# Patient Record
Sex: FEMALE | Race: WHITE | NOT HISPANIC OR LATINO | Employment: FULL TIME | URBAN - METROPOLITAN AREA
[De-identification: names, ages, dates, MRNs, and addresses within clinical notes are randomized per-mention and may not be internally consistent; named-entity substitution may affect disease eponyms.]

---

## 2017-01-02 ENCOUNTER — TELEPHONE (OUTPATIENT)
Dept: NURSING | Facility: CLINIC | Age: 22
End: 2017-01-02

## 2017-01-02 NOTE — TELEPHONE ENCOUNTER
Call Type: Triage Call    Presenting Problem: Patient calls and says she was seen at the end of  December and just now got her voicemails including one from the  clinic.  Advised her of results of her Pap test.  Read note on chart  to her.  Progress Notes   ?   Elizabeth Rashid, RN at 12/28/2016  5:22 AM   ? Status: Sign at close encounter      ? Expand All  Collapse All      12/22/2016:Pap--LSIL. <24 yrs of age. Plan to  repeat Cytology only in 1 year. Reminder placed in TRACKING  Results/Follow-up plan sent to patient by mail.  Elizabeth Rashid,  Pap Tracking RN        Advised her to call clinic tomorrow with any  further questions or concerns. She states understanding.  Triage Note:  Guideline Title: Information Only Call; No Symptom Triage (Adult)  Recommended Disposition: Provide Information or Advice Only  Original Inclination: Wanted to speak with a nurse  Override Disposition:  Intended Action: Follow advice given  Physician Contacted: No  Follow-up call to recent contact; no triage required. Information provided from  past call documentation, approved references or experience. ?  YES  Requesting regular office appointment ? NO  Sign(s) or symptom(s) associated with a diagnosed condition or with a new illness  ? NO  Requesting information about provider, services or community resources ? NO  Call back to complete assessment/clarification of information from prior caller to  complete triage ? NO  Requesting information and provider is best resource; no triage required. ? NO  Caller not with patient and is unable to provide clinical information about  patient to facilitate triage. ? NO  Requesting provider information for recently scheduled test, procedure; no triage  required. Needed information not available per approved resources or clinical  experience. ? NO  Requesting information not available per approved reference or clinical  experience; no triage required. ? NO  Requesting information regarding  scheduled exam, test or procedure; no triage  required. Information provided from approved resources or clinical experience. ?  NO  General information question; no triage required. Information provided from  approved references or knowledge of organization. ? NO  Health information question; person denies any symptoms, no triage required.  Information provided from approved references or clinical experience. ? NO  Physician Instructions:  Care Advice:

## 2017-06-26 ENCOUNTER — OFFICE VISIT (OUTPATIENT)
Dept: FAMILY MEDICINE | Facility: CLINIC | Age: 22
End: 2017-06-26
Payer: COMMERCIAL

## 2017-06-26 VITALS
TEMPERATURE: 98.1 F | WEIGHT: 148.5 LBS | OXYGEN SATURATION: 97 % | HEART RATE: 85 BPM | SYSTOLIC BLOOD PRESSURE: 110 MMHG | HEIGHT: 64 IN | BODY MASS INDEX: 25.35 KG/M2 | DIASTOLIC BLOOD PRESSURE: 90 MMHG

## 2017-06-26 DIAGNOSIS — N30.00 ACUTE CYSTITIS WITHOUT HEMATURIA: ICD-10-CM

## 2017-06-26 DIAGNOSIS — G47.00 INSOMNIA, UNSPECIFIED TYPE: Primary | ICD-10-CM

## 2017-06-26 DIAGNOSIS — J45.990 EXERCISE-INDUCED ASTHMA: ICD-10-CM

## 2017-06-26 LAB
ALBUMIN SERPL-MCNC: 3.8 G/DL (ref 3.4–5)
ALBUMIN UR-MCNC: NEGATIVE MG/DL
ALP SERPL-CCNC: 52 U/L (ref 40–150)
ALT SERPL W P-5'-P-CCNC: 18 U/L (ref 0–50)
ANION GAP SERPL CALCULATED.3IONS-SCNC: 8 MMOL/L (ref 3–14)
APPEARANCE UR: ABNORMAL
AST SERPL W P-5'-P-CCNC: 21 U/L (ref 0–45)
BACTERIA #/AREA URNS HPF: ABNORMAL /HPF
BASOPHILS # BLD AUTO: 0 10E9/L (ref 0–0.2)
BASOPHILS NFR BLD AUTO: 1.1 %
BILIRUB SERPL-MCNC: 0.8 MG/DL (ref 0.2–1.3)
BILIRUB UR QL STRIP: NEGATIVE
BUN SERPL-MCNC: 10 MG/DL (ref 7–30)
CALCIUM SERPL-MCNC: 9.2 MG/DL (ref 8.5–10.1)
CHLORIDE SERPL-SCNC: 105 MMOL/L (ref 94–109)
CO2 SERPL-SCNC: 27 MMOL/L (ref 20–32)
COLOR UR AUTO: YELLOW
CREAT SERPL-MCNC: 0.75 MG/DL (ref 0.52–1.04)
DEPRECATED CALCIDIOL+CALCIFEROL SERPL-MC: 54 UG/L (ref 20–75)
DIFFERENTIAL METHOD BLD: ABNORMAL
EOSINOPHIL # BLD AUTO: 0.1 10E9/L (ref 0–0.7)
EOSINOPHIL NFR BLD AUTO: 2.6 %
ERYTHROCYTE [DISTWIDTH] IN BLOOD BY AUTOMATED COUNT: 11.6 % (ref 10–15)
GFR SERPL CREATININE-BSD FRML MDRD: NORMAL ML/MIN/1.7M2
GLUCOSE SERPL-MCNC: 87 MG/DL (ref 70–99)
GLUCOSE UR STRIP-MCNC: NEGATIVE MG/DL
HCT VFR BLD AUTO: 40.7 % (ref 35–47)
HGB BLD-MCNC: 13.3 G/DL (ref 11.7–15.7)
HGB UR QL STRIP: ABNORMAL
KETONES UR STRIP-MCNC: NEGATIVE MG/DL
LEUKOCYTE ESTERASE UR QL STRIP: ABNORMAL
LYMPHOCYTES # BLD AUTO: 1.4 10E9/L (ref 0.8–5.3)
LYMPHOCYTES NFR BLD AUTO: 39.2 %
MCH RBC QN AUTO: 30.9 PG (ref 26.5–33)
MCHC RBC AUTO-ENTMCNC: 32.7 G/DL (ref 31.5–36.5)
MCV RBC AUTO: 94 FL (ref 78–100)
MONOCYTES # BLD AUTO: 0.4 10E9/L (ref 0–1.3)
MONOCYTES NFR BLD AUTO: 10.2 %
MUCOUS THREADS #/AREA URNS LPF: PRESENT /LPF
NEUTROPHILS # BLD AUTO: 1.7 10E9/L (ref 1.6–8.3)
NEUTROPHILS NFR BLD AUTO: 46.9 %
NITRATE UR QL: POSITIVE
PH UR STRIP: 6 PH (ref 5–7)
PLATELET # BLD AUTO: 250 10E9/L (ref 150–450)
POTASSIUM SERPL-SCNC: 3.8 MMOL/L (ref 3.4–5.3)
PROT SERPL-MCNC: 7.9 G/DL (ref 6.8–8.8)
RBC # BLD AUTO: 4.31 10E12/L (ref 3.8–5.2)
RBC #/AREA URNS AUTO: ABNORMAL /HPF (ref 0–2)
SODIUM SERPL-SCNC: 140 MMOL/L (ref 133–144)
SP GR UR STRIP: 1.02 (ref 1–1.03)
TSH SERPL DL<=0.005 MIU/L-ACNC: 1.9 MU/L (ref 0.4–4)
URN SPEC COLLECT METH UR: ABNORMAL
UROBILINOGEN UR STRIP-ACNC: 0.2 EU/DL (ref 0.2–1)
WBC # BLD AUTO: 3.5 10E9/L (ref 4–11)
WBC #/AREA URNS AUTO: ABNORMAL /HPF (ref 0–2)

## 2017-06-26 PROCEDURE — 84443 ASSAY THYROID STIM HORMONE: CPT | Performed by: PHYSICIAN ASSISTANT

## 2017-06-26 PROCEDURE — 81001 URINALYSIS AUTO W/SCOPE: CPT | Performed by: PHYSICIAN ASSISTANT

## 2017-06-26 PROCEDURE — 36415 COLL VENOUS BLD VENIPUNCTURE: CPT | Performed by: PHYSICIAN ASSISTANT

## 2017-06-26 PROCEDURE — 80050 GENERAL HEALTH PANEL: CPT | Performed by: PHYSICIAN ASSISTANT

## 2017-06-26 PROCEDURE — 99214 OFFICE O/P EST MOD 30 MIN: CPT | Performed by: PHYSICIAN ASSISTANT

## 2017-06-26 PROCEDURE — 82306 VITAMIN D 25 HYDROXY: CPT | Performed by: PHYSICIAN ASSISTANT

## 2017-06-26 ASSESSMENT — ANXIETY QUESTIONNAIRES
3. WORRYING TOO MUCH ABOUT DIFFERENT THINGS: SEVERAL DAYS
5. BEING SO RESTLESS THAT IT IS HARD TO SIT STILL: SEVERAL DAYS
7. FEELING AFRAID AS IF SOMETHING AWFUL MIGHT HAPPEN: NOT AT ALL
6. BECOMING EASILY ANNOYED OR IRRITABLE: SEVERAL DAYS
2. NOT BEING ABLE TO STOP OR CONTROL WORRYING: SEVERAL DAYS
1. FEELING NERVOUS, ANXIOUS, OR ON EDGE: SEVERAL DAYS
GAD7 TOTAL SCORE: 6

## 2017-06-26 ASSESSMENT — PATIENT HEALTH QUESTIONNAIRE - PHQ9: 5. POOR APPETITE OR OVEREATING: SEVERAL DAYS

## 2017-06-26 NOTE — NURSING NOTE
"Chief Complaint   Patient presents with     Recheck Medication       Initial BP (!) 128/100  Pulse 85  Temp 98.1  F (36.7  C) (Oral)  Ht 5' 4.25\" (1.632 m)  Wt 148 lb 8 oz (67.4 kg)  SpO2 97%  BMI 25.29 kg/m2 Estimated body mass index is 25.29 kg/(m^2) as calculated from the following:    Height as of this encounter: 5' 4.25\" (1.632 m).    Weight as of this encounter: 148 lb 8 oz (67.4 kg).  Medication Reconciliation: complete     Lisa Smith MA      "

## 2017-06-26 NOTE — PROGRESS NOTES
SUBJECTIVE:                                                    Amy Jauregui is a 21 year old female who presents to clinic today for the following health issues:    Insomnia follow up   Onset: 3 weeks    Description:   Time to fall asleep (sleep latency): 4 hours  Middle of night awakening:  YES  Early morning awakening:  YES    Progression of Symptoms:  same    Accompanying Signs & Symptoms:  Daytime sleepiness/napping: YES  Excessive snoring/apnea: no   Restless legs: no  Frequent urination: no  Chronic pain:  Neck is getting more stiff, headaches right before she lays down at night    History:  Prior Insomnia: no    Precipitating factors:   New stressful situation: no  Caffeine intake: no, has not had any caffeine since her last office visit  OTC decongestants: no  Any new medications: no    Alleviating factors:  Self medicating (alcohol, etc.):  no    Therapies Tried and outcome: none    Has been treated for UTI. Feels no different. She has a history of frequent UTI's    Blood pressure is back to normal    Sleep hasn't changed at all. She woke up Monday drenched in sweat. Fell back asleep and woke up drenched again.     She is sexually active    The night sweats started with her insomnia    LMP: 6/22/17    Has tried melatonin before which didn't help        Problem list and histories reviewed & adjusted, as indicated.  Additional history: as documented    ROS:  C: NEGATIVE for fever, chills, change in weight  I: NEGATIVE for worrisome rashes, moles or lesions  E: NEGATIVE for vision changes or irritation  E/M: NEGATIVE for ear, mouth and throat problems  R: NEGATIVE for significant cough or SOB  B: NEGATIVE for masses, tenderness or discharge  CV: NEGATIVE for chest pain, palpitations or peripheral edema  GI: NEGATIVE for nausea, abdominal pain, heartburn, or change in bowel habits  : NEGATIVE for frequency, dysuria, or hematuria  M: NEGATIVE for significant arthralgias or myalgia  N: NEGATIVE for  weakness, dizziness or paresthesias  E: NEGATIVE for temperature intolerance, skin/hair changes  H: NEGATIVE for bleeding problems  P: NEGATIVE for changes in mood or affect    Patient Active Problem List   Diagnosis     Exercise-induced asthma     Intermittent asthma     Congenital anomaly of urinary system     Moderate major depression (H)     Pyelonephritis     Duplicated urinary collecting system     Anemia     Frequent UTI     Dysmenorrhea     Irregular menses     Anxiety     Upper respiratory tract infection, unspecified upper respiratory infection     LSIL on pap (<25 yrs of age)     Past Surgical History:   Procedure Laterality Date     ABDOMEN SURGERY       CYSTOSCOPY  7/10    and vaginoscopy= normal     SURGICAL HISTORY OF -   08/15/02    Right extravesical ureteral reimplant right ectopic upper pole refluxing ureter       Social History   Substance Use Topics     Smoking status: Never Smoker     Smokeless tobacco: Never Used     Alcohol use Yes      Comment: occ     Family History   Problem Relation Age of Onset     Cardiovascular Mother      Unknown/Adopted Mother      Hypertension Maternal Grandmother      Unknown/Adopted Maternal Grandmother      DIABETES Maternal Grandfather      Unknown/Adopted Maternal Grandfather      Unknown/Adopted Father      Unknown/Adopted Brother      Unknown/Adopted Sister      Unknown/Adopted Paternal Grandmother      Unknown/Adopted Paternal Grandfather            Labs reviewed in EPIC  BP Readings from Last 3 Encounters:   06/29/17 120/76   06/26/17 110/90   12/22/16 138/79    Wt Readings from Last 3 Encounters:   06/29/17 148 lb 6.4 oz (67.3 kg)   06/26/17 148 lb 8 oz (67.4 kg)   12/22/16 149 lb 6.4 oz (67.8 kg)                    OBJECTIVE:                                                    /76  Pulse 108  Temp 97.3  F (36.3  C) (Oral)  Wt 148 lb 6.4 oz (67.3 kg)  SpO2 96%  BMI 25.27 kg/m2 Body mass index is 25.27 kg/(m^2).   GENERAL: healthy, alert and no  "distress  PSYCH: Alert and oriented times 3; coherent speech, normal rate and volume, able to articulate logical thoughts, able to abstract reason, no tangential thoughts, no hallucinations or delusions. Affect is normal.       ASSESSMENT/PLAN:                                                        ICD-10-CM    1. Insomnia, unspecified type G47.00 traZODone (DESYREL) 50 MG tablet     Labs were all normal except UTI which she's been treated for. Trial of trazodone and we'll have her follow up with sleep specialist. Return to clinic for any new or worsening symptoms or go to ER Urgent care in off hours      > 15 minutes were spent with the patient and / or family present in education and / or counseling regarding the above issues.  This represented more than 50% of the time spent interacting with the patient during this visit.     Patient Instructions   Start trazodone as needed 25mg to start. Ok to go up to 100 mg if needed  Follow up for sleep study  Return to clinic for any new or worsening symptoms or go to ER Urgent care in off hours          Estimated body mass index is 25.27 kg/(m^2) as calculated from the following:    Height as of 6/26/17: 5' 4.25\" (1.632 m).    Weight as of this encounter: 148 lb 6.4 oz (67.3 kg).       Marie Hearn  [facility] [place]    "

## 2017-06-26 NOTE — PATIENT INSTRUCTIONS
Get labs done today  Schedule sleep study  Follow up with me afterwards  Return to clinic for any new or worsening symptoms or go to ER Urgent care in off hours

## 2017-06-26 NOTE — PROGRESS NOTES
SUBJECTIVE:                                                    Amy Jauregui is a 21 year old female who presents to clinic today for the following health issues:    Insomnia  Onset: 2 weeks ago was home for the weekend seeing friends and family, when she came up she was up all night for 4 days    Description:   Time to fall asleep (sleep latency): Lays down around 1030pm and wont fall asleep until 2:30am  Middle of night awakening:  YES  Early morning awakening:  YES    Progression of Symptoms:  same    Accompanying Signs & Symptoms:  Daytime sleepiness/napping: YES  Excessive snoring/apnea: no  Restless legs: no  Frequent urination: YES- sometimes  Chronic pain:  No, but neck is a little stiff    History:  Prior Insomnia: no    Precipitating factors:   New stressful situation: no  Caffeine intake: YES- tea  OTC decongestants: no  Any new medications: no    Alleviating factors:  Self medicating (alcohol, etc.):  no    Therapies Tried and outcome: Exercise, breathing techniques, not eating before bed      Most she sleeps is 3 hours, works every day    Family history of sleep apnea and insomnia    She's tried sleeping medication and it gives her bad nightmares  It takes her about 4 hours to fall asleep  She was sent home from work because she was so tired.   Everything started a few weeks ago  This has never happened before    Admits to always having really weird dreams but when she takes benadryl or  zquil which give her terrifying nightmares. 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   At night she will usually watch a movie a few times per week.   She's tried working out at night. Normally works out between 6-8 pm     Her mom got remarried while she was abroad last year in October, so this was difficult for her but they were never close.   See PHQ-9  and GURU scores          Problem list and histories reviewed & adjusted, as indicated.  Additional history: as documented    ROS:  C: NEGATIVE for fever, chills, change in weight  I: NEGATIVE for worrisome rashes, moles or lesions  E: NEGATIVE for vision changes or irritation  E/M: NEGATIVE for ear, mouth and throat problems  R: NEGATIVE for significant cough or SOB  B: NEGATIVE for masses, tenderness or discharge  CV: NEGATIVE for chest pain, palpitations or peripheral edema  GI: NEGATIVE for nausea, abdominal pain, heartburn, or change in bowel habits  : NEGATIVE for frequency, dysuria, or hematuria  M: NEGATIVE for significant arthralgias or myalgia  N: NEGATIVE for weakness, dizziness or paresthesias  E: NEGATIVE for temperature intolerance, skin/hair changes  H: NEGATIVE for bleeding problems  P: NEGATIVE for changes in mood or affect    Patient Active Problem List   Diagnosis     Exercise-induced asthma     Intermittent asthma     Congenital anomaly of urinary system     Moderate major depression (H)     Pyelonephritis     Duplicated urinary collecting system     Anemia     Frequent UTI     Dysmenorrhea     Irregular menses     Anxiety     Upper respiratory tract infection, unspecified upper respiratory infection     LSIL on pap (<25 yrs of age)     Past Surgical History:   Procedure Laterality Date     ABDOMEN SURGERY       CYSTOSCOPY  7/10    and vaginoscopy= normal     SURGICAL HISTORY OF -   08/15/02    Right extravesical ureteral reimplant right ectopic upper pole refluxing ureter       Social History   Substance Use Topics     Smoking status: Never Smoker     Smokeless tobacco: Never Used     Alcohol use Yes      Comment: occ     Family History   Problem Relation Age of Onset     Cardiovascular Mother      Unknown/Adopted Mother      Hypertension Maternal Grandmother      Unknown/Adopted Maternal Grandmother      DIABETES Maternal Grandfather      Unknown/Adopted Maternal Grandfather       "Unknown/Adopted Father      Unknown/Adopted Brother      Unknown/Adopted Sister      Unknown/Adopted Paternal Grandmother      Unknown/Adopted Paternal Grandfather            Problem list, Medication list, Allergies, and Medical/Social/Surgical histories reviewed in [facility] and updated as appropriate.  Labs reviewed in EPIC  BP Readings from Last 3 Encounters:   06/26/17 110/90   12/22/16 138/79   08/29/16 102/56    Wt Readings from Last 3 Encounters:   06/26/17 148 lb 8 oz (67.4 kg)   12/22/16 149 lb 6.4 oz (67.8 kg)   08/29/16 146 lb 3.2 oz (66.3 kg)                    OBJECTIVE:                                                    /90  Pulse 85  Temp 98.1  F (36.7  C) (Oral)  Ht 5' 4.25\" (1.632 m)  Wt 148 lb 8 oz (67.4 kg)  SpO2 97%  BMI 25.29 kg/m2 Body mass index is 25.29 kg/(m^2).   GENERAL: healthy, alert, well nourished, well hydrated, no distress  EYES: Eyes grossly normal to inspection, extraocular movements - intact, and PERRL  HENT: ear canals- normal; TMs- normal; Nose- normal; Mouth- no ulcers, no lesions  NECK: no tenderness, no adenopathy, no asymmetry, no masses, no stiffness; thyroid- normal to palpation  RESP: lungs clear to auscultation - no rales, no rhonchi, no wheezes  CV: regular rates and rhythm, normal S1 S2, no S3 or S4 and no murmur, no click or rub -  ABDOMEN: soft, no tenderness, no  hepatosplenomegaly, no masses, normal bowel sounds  MS: extremities- no gross deformities noted, no edema  NEURO: strength and tone- normal, sensory exam- grossly normal, mentation- intact, speech- normal, reflexes- symmetric  PSYCH: Alert and oriented times 3; speech- coherent , normal rate and volume; able to articulate logical thoughts, able to abstract reason, no tangential thoughts, no hallucinations or delusions, affect- normal    Results for orders placed or performed in visit on 06/26/17 (from the past 24 hour(s))   TSH with free T4 reflex   Result Value Ref Range    TSH 1.90 0.40 - 4.00 mU/L "   CBC with platelets and differential   Result Value Ref Range    WBC 3.5 (L) 4.0 - 11.0 10e9/L    RBC Count 4.31 3.8 - 5.2 10e12/L    Hemoglobin 13.3 11.7 - 15.7 g/dL    Hematocrit 40.7 35.0 - 47.0 %    MCV 94 78 - 100 fl    MCH 30.9 26.5 - 33.0 pg    MCHC 32.7 31.5 - 36.5 g/dL    RDW 11.6 10.0 - 15.0 %    Platelet Count 250 150 - 450 10e9/L    Diff Method Automated Method     % Neutrophils 46.9 %    % Lymphocytes 39.2 %    % Monocytes 10.2 %    % Eosinophils 2.6 %    % Basophils 1.1 %    Absolute Neutrophil 1.7 1.6 - 8.3 10e9/L    Absolute Lymphocytes 1.4 0.8 - 5.3 10e9/L    Absolute Monocytes 0.4 0.0 - 1.3 10e9/L    Absolute Eosinophils 0.1 0.0 - 0.7 10e9/L    Absolute Basophils 0.0 0.0 - 0.2 10e9/L   Vitamin D Deficiency   Result Value Ref Range    Vitamin D Deficiency screening 54 20 - 75 ug/L   Comprehensive metabolic panel   Result Value Ref Range    Sodium 140 133 - 144 mmol/L    Potassium 3.8 3.4 - 5.3 mmol/L    Chloride 105 94 - 109 mmol/L    Carbon Dioxide 27 20 - 32 mmol/L    Anion Gap 8 3 - 14 mmol/L    Glucose 87 70 - 99 mg/dL    Urea Nitrogen 10 7 - 30 mg/dL    Creatinine 0.75 0.52 - 1.04 mg/dL    GFR Estimate >90  Non  GFR Calc   >60 mL/min/1.7m2    GFR Estimate If Black >90   GFR Calc   >60 mL/min/1.7m2    Calcium 9.2 8.5 - 10.1 mg/dL    Bilirubin Total 0.8 0.2 - 1.3 mg/dL    Albumin 3.8 3.4 - 5.0 g/dL    Protein Total 7.9 6.8 - 8.8 g/dL    Alkaline Phosphatase 52 40 - 150 U/L    ALT 18 0 - 50 U/L    AST 21 0 - 45 U/L   *UA reflex to Microscopic   Result Value Ref Range    Color Urine Yellow     Appearance Urine Slightly Cloudy     Glucose Urine Negative NEG mg/dL    Bilirubin Urine Negative NEG    Ketones Urine Negative NEG mg/dL    Specific Gravity Urine 1.020 1.003 - 1.035    Blood Urine Trace (A) NEG    pH Urine 6.0 5.0 - 7.0 pH    Protein Albumin Urine Negative NEG mg/dL    Urobilinogen Urine 0.2 0.2 - 1.0 EU/dL    Nitrite Urine Positive (A) NEG    Leukocyte  "Esterase Urine Trace (A) NEG    Source Midstream Urine    Urine Microscopic   Result Value Ref Range    WBC Urine 10-25 (A) 0 - 2 /HPF    RBC Urine O - 2 0 - 2 /HPF    Bacteria Urine Many (A) NEG /HPF    Mucous Urine Present (A) NEG /LPF          ASSESSMENT/PLAN:                                                        ICD-10-CM    1. Insomnia, unspecified type G47.00 SLEEP EVALUATION & MANAGEMENT REFERRAL - ADULT     TSH with free T4 reflex     CBC with platelets and differential     *UA reflex to Microscopic     Vitamin D Deficiency     Comprehensive metabolic panel     Urine Microscopic   2. Exercise-induced asthma J45.990    3. Acute cystitis without hematuria N30.00 ciprofloxacin (CIPRO) 500 MG tablet     UTI may be contributing but I'm concerned about a neuroendocrine cause since her diastolic blood pressure is a little high. Her blood panel is normal so far. May need some more endocrine testing but we'll see if she feels improved with treatment for UTI. See below. Return to clinic for any new or worsening symptoms or go to ER Urgent care in off hours    Patient Instructions   Get labs done today  Schedule sleep study  Follow up with me afterwards  Return to clinic for any new or worsening symptoms or go to ER Urgent care in off hours          Estimated body mass index is 25.29 kg/(m^2) as calculated from the following:    Height as of this encounter: 5' 4.25\" (1.632 m).    Weight as of this encounter: 148 lb 8 oz (67.4 kg).       Marie Hearn  [facility] [place]    "

## 2017-06-26 NOTE — MR AVS SNAPSHOT
After Visit Summary   6/26/2017    Amy Jauregui    MRN: [id number]           Patient Information     Date Of Birth          1995        Visit Information        Provider Department      6/26/2017 10:40 AM Marie Hearn PA-C [facility] [place] [facility]        Today's Diagnoses     Insomnia, unspecified type    -  1    Exercise-induced asthma          Care Instructions    Get labs done today  Schedule sleep study  Follow up with me afterwards  Return to clinic for any new or worsening symptoms or go to ER Urgent care in off hours            Follow-ups after your visit        Additional Services     SLEEP EVALUATION & MANAGEMENT REFERRAL - ADULT       Please be aware that coverage of these services is subject to the terms and limitations of your health insurance plan.  Call member services at your health plan with any benefit or coverage questions.      Please bring the following to your appointment:    >>   List of current medications   >>   This referral request   >>   Any documents/labs given to you for this referral    [facility]  Ph [phone] (Age 15 and up)                  Your next 10 appointments already scheduled     Jun 29, 2017  9:00 AM CDT   SHORT with Marie Hearn PA-C   [facility] ([facility])    [address] [place] [id number] [phone]              Future tests that were ordered for you today     Open Future Orders        Priority Expected Expires Ordered    SLEEP EVALUATION & MANAGEMENT REFERRAL - ADULT Routine  6/26/2018 6/26/2017            Who to contact     If you have questions or need follow up information about today's clinic visit or your schedule please contact [facility] directly at [phone].  Normal or non-critical lab and imaging results will be communicated to you by MyChart, letter or phone within 4 business days after the  "clinic has received the results. If you do not hear from us within 7 days, please contact the clinic through [employer] or phone. If you have a critical or abnormal lab result, we will notify you by phone as soon as possible.  Submit refill requests through [employer] or call your pharmacy and they will forward the refill request to us. Please allow 3 business days for your refill to be completed.          Additional Information About Your Visit        Movie Mouth[employer]OTV Information     [employer] gives you secure access to your electronic health record. If you see a primary care provider, you can also send messages to your care team and make appointments. If you have questions, please call your primary care clinic.  If you do not have a primary care provider, please call [phone] and they will assist you.        Care EveryWhere ID     This is your Care EveryWhere ID. This could be used by other organizations to access your [facility] medical records  [id number]        Your Vitals Were     Pulse Temperature Height Pulse Oximetry BMI (Body Mass Index)       85 98.1  F (36.7  C) (Oral) 5' 4.25\" (1.632 m) 97% 25.29 kg/m2        Blood Pressure from Last 3 Encounters:   06/26/17 110/90   12/22/16 138/79   08/29/16 102/56    Weight from Last 3 Encounters:   06/26/17 148 lb 8 oz (67.4 kg)   12/22/16 149 lb 6.4 oz (67.8 kg)   08/29/16 146 lb 3.2 oz (66.3 kg)              We Performed the Following     *UA reflex to Microscopic     Asthma Action Plan (AAP)     CBC with platelets and differential     Comprehensive metabolic panel     TSH with free T4 reflex     Vitamin D Deficiency        Primary Care Provider Office Phone # Fax #    R Blair Reynolds [phone]       [address] [address]        Equal Access to Services     ARYA HODGE : Eren Hawkins, johnny porter, qaybta kaalmavalente mclean, joe wong. So [place] " [phone].    ATENCIÓN: Si flavia márquez, tiene a souza disposición servicios gratuitos de asistencia lingüística. Sandra shirley [phone].    We comply with applicable federal civil rights laws and [place] laws. We do not discriminate on the basis of race, color, national origin, age, disability sex, sexual orientation or gender identity.            Thank you!     Thank you for choosing [facility]  for your care. Our goal is always to provide you with excellent care. Hearing back from our patients is one way we can continue to improve our services. Please take a few minutes to complete the written survey that you may receive in the mail after your visit with us. Thank you!             Your Updated Medication List - Protect others around you: Learn how to safely use, store and throw away your medicines at www.disposemymeds.org.          This list is accurate as of: 6/26/17 11:27 AM.  Always use your most recent med list.                   Brand Name Dispense Instructions for use Diagnosis    albuterol 108 (90 BASE) MCG/ACT Inhaler    PROAIR HFA/PROVENTIL HFA/VENTOLIN HFA    1 Inhaler    Inhale 2 puffs into the lungs every 4 hours as needed for shortness of breath / dyspnea    Exercise-induced asthma       norgestim-eth estrad triphasic 0.18/0.215/0.25 MG-35 MCG per tablet    TRINESSA (28)    84 tablet    Take 1 tablet by mouth daily    Dysmenorrhea

## 2017-06-27 ENCOUNTER — TELEPHONE (OUTPATIENT)
Dept: FAMILY MEDICINE | Facility: CLINIC | Age: 22
End: 2017-06-27

## 2017-06-27 RX ORDER — CIPROFLOXACIN 500 MG/1
500 TABLET, FILM COATED ORAL 2 TIMES DAILY
Qty: 6 TABLET | Refills: 0 | Status: SHIPPED | OUTPATIENT
Start: 2017-06-27 | End: 2017-07-18

## 2017-06-27 ASSESSMENT — PATIENT HEALTH QUESTIONNAIRE - PHQ9: SUM OF ALL RESPONSES TO PHQ QUESTIONS 1-9: 10

## 2017-06-27 ASSESSMENT — ANXIETY QUESTIONNAIRES: GAD7 TOTAL SCORE: 6

## 2017-06-27 ASSESSMENT — ASTHMA QUESTIONNAIRES: ACT_TOTALSCORE: 17

## 2017-06-27 NOTE — TELEPHONE ENCOUNTER
Patient called back, she has picked up antibiotic and is scheduled to see Anamika Hearn PA-C on Thursday.     Darlene Robert RN  [facility]

## 2017-06-27 NOTE — TELEPHONE ENCOUNTER
Called and left message for patient to call back. It appears she has another UTI. I will treat with cipro and see her back on Thursday. Let me know if questions. Thanks!

## 2017-06-29 ENCOUNTER — OFFICE VISIT (OUTPATIENT)
Dept: FAMILY MEDICINE | Facility: CLINIC | Age: 22
End: 2017-06-29
Payer: COMMERCIAL

## 2017-06-29 VITALS
SYSTOLIC BLOOD PRESSURE: 120 MMHG | HEART RATE: 108 BPM | TEMPERATURE: 97.3 F | OXYGEN SATURATION: 96 % | DIASTOLIC BLOOD PRESSURE: 76 MMHG | WEIGHT: 148.4 LBS | BODY MASS INDEX: 25.27 KG/M2

## 2017-06-29 DIAGNOSIS — G47.00 INSOMNIA, UNSPECIFIED TYPE: Primary | ICD-10-CM

## 2017-06-29 PROCEDURE — 99213 OFFICE O/P EST LOW 20 MIN: CPT | Performed by: PHYSICIAN ASSISTANT

## 2017-06-29 RX ORDER — TRAZODONE HYDROCHLORIDE 50 MG/1
25-50 TABLET, FILM COATED ORAL
Qty: 30 TABLET | Refills: 0 | Status: SHIPPED | OUTPATIENT
Start: 2017-06-29 | End: 2017-08-23

## 2017-06-29 NOTE — MR AVS SNAPSHOT
After Visit Summary   6/29/2017    Amy Jauregui    MRN: [id number]           Patient Information     Date Of Birth          1995        Visit Information        Provider Department      6/29/2017 9:00 AM Marie Hearn PA-C [facility]        Today's Diagnoses     Insomnia, unspecified type    -  1      Care Instructions    Start trazodone as needed 25mg to start. Ok to go up to 100 mg if needed  Follow up for sleep study  Return to clinic for any new or worsening symptoms or go to ER Urgent care in off hours            Follow-ups after your visit        Your next 10 appointments already scheduled     Jul 18, 2017  8:00 AM CDT   New Sleep Patient with Gómez Chase MD   [facility] [place], Sleep Study ([facility])    [address] [place] [id number] [phone]              Who to contact     If you have questions or need follow up information about today's clinic visit or your schedule please contact [facility] directly at [phone].  Normal or non-critical lab and imaging results will be communicated to you by Lookeryhart, letter or phone within 4 business days after the clinic has received the results. If you do not hear from us within 7 days, please contact the clinic through Lookeryhart or phone. If you have a critical or abnormal lab result, we will notify you by phone as soon as possible.  Submit refill requests through G-CON or call your pharmacy and they will forward the refill request to us. Please allow 3 business days for your refill to be completed.          Additional Information About Your Visit        Lookeryhart Information     G-CON gives you secure access to your electronic health record. If you see a primary care provider, you can also send messages to your care team and make appointments. If you have questions, please call your primary care clinic.  If  you do not have a primary care provider, please call [phone] and they will assist you.        Care EveryWhere ID     This is your Care EveryWhere ID. This could be used by other organizations to access your [facility] medical records  [id number]        Your Vitals Were     Pulse Temperature Pulse Oximetry BMI (Body Mass Index)          108 97.3  F (36.3  C) (Oral) 96% 25.27 kg/m2         Blood Pressure from Last 3 Encounters:   06/29/17 120/76   06/26/17 110/90   12/22/16 138/79    Weight from Last 3 Encounters:   06/29/17 148 lb 6.4 oz (67.3 kg)   06/26/17 148 lb 8 oz (67.4 kg)   12/22/16 149 lb 6.4 oz (67.8 kg)              Today, you had the following     No orders found for display         Today's Medication Changes          These changes are accurate as of: 6/29/17  9:26 AM.  If you have any questions, ask your nurse or doctor.               Start taking these medicines.        Dose/Directions    traZODone 50 MG tablet   Commonly known as:  DESYREL   Used for:  Insomnia, unspecified type   Started by:  Marie Hearn PA-C        Dose:  25-50 mg   Take 0.5-1 tablets (25-50 mg) by mouth nightly as needed for sleep   Quantity:  30 tablet   Refills:  0            Where to get your medicines      These medications were sent to [facility] [address]  [address] [facility] [address]     Phone:  [phone]     traZODone 50 MG tablet                Primary Care Provider Office Phone # Fax #    R Blair Reyonlds [phone]       [address] [address]        Equal Access to Services     [facility] AYESHA AH: Eren Hawkins, johnny porter, kelli kaalmada vinay, joe wong. So [facility] [phone].    ATENCIÓN: Si habla español, tiene a souza disposición servicios gratuitos de asistencia lingüística. Llame al [phone].    We comply with applicable federal civil rights  laws and [place] laws. We do not discriminate on the basis of race, color, national origin, age, disability sex, sexual orientation or gender identity.            Thank you!     Thank you for choosing [facility]  for your care. Our goal is always to provide you with excellent care. Hearing back from our patients is one way we can continue to improve our services. Please take a few minutes to complete the written survey that you may receive in the mail after your visit with us. Thank you!             Your Updated Medication List - Protect others around you: Learn how to safely use, store and throw away your medicines at www.disposemymeds.org.          This list is accurate as of: 6/29/17  9:26 AM.  Always use your most recent med list.                   Brand Name Dispense Instructions for use Diagnosis    albuterol 108 (90 BASE) MCG/ACT Inhaler    PROAIR HFA/PROVENTIL HFA/VENTOLIN HFA    1 Inhaler    Inhale 2 puffs into the lungs every 4 hours as needed for shortness of breath / dyspnea    Exercise-induced asthma       ciprofloxacin 500 MG tablet    CIPRO    6 tablet    Take 1 tablet (500 mg) by mouth 2 times daily    Acute cystitis without hematuria       norgestim-eth estrad triphasic 0.18/0.215/0.25 MG-35 MCG per tablet    TRINESSA (28)    84 tablet    Take 1 tablet by mouth daily    Dysmenorrhea       traZODone 50 MG tablet    DESYREL    30 tablet    Take 0.5-1 tablets (25-50 mg) by mouth nightly as needed for sleep    Insomnia, unspecified type

## 2017-06-29 NOTE — PATIENT INSTRUCTIONS
Start trazodone as needed 25mg to start. Ok to go up to 100 mg if needed  Follow up for sleep study  Return to clinic for any new or worsening symptoms or go to ER Urgent care in off hours

## 2017-06-29 NOTE — NURSING NOTE
"Chief Complaint   Patient presents with     Insomnia       Initial /76  Pulse 108  Temp 97.3  F (36.3  C) (Oral)  Wt 148 lb 6.4 oz (67.3 kg)  SpO2 96%  BMI 25.27 kg/m2 Estimated body mass index is 25.27 kg/(m^2) as calculated from the following:    Height as of 6/26/17: 5' 4.25\" (1.632 m).    Weight as of this encounter: 148 lb 6.4 oz (67.3 kg).  Medication Reconciliation: complete     Lisa Smith MA    "

## 2017-07-15 ENCOUNTER — HEALTH MAINTENANCE LETTER (OUTPATIENT)
Age: 22
End: 2017-07-15

## 2017-07-18 ENCOUNTER — OFFICE VISIT (OUTPATIENT)
Dept: SLEEP MEDICINE | Facility: CLINIC | Age: 22
End: 2017-07-18
Attending: PHYSICIAN ASSISTANT
Payer: COMMERCIAL

## 2017-07-18 VITALS
RESPIRATION RATE: 16 BRPM | WEIGHT: 149 LBS | BODY MASS INDEX: 25.44 KG/M2 | SYSTOLIC BLOOD PRESSURE: 122 MMHG | OXYGEN SATURATION: 98 % | DIASTOLIC BLOOD PRESSURE: 85 MMHG | HEIGHT: 64 IN | HEART RATE: 74 BPM

## 2017-07-18 DIAGNOSIS — G47.00 INSOMNIA, UNSPECIFIED TYPE: Primary | ICD-10-CM

## 2017-07-18 DIAGNOSIS — G47.00 INSOMNIA, UNSPECIFIED TYPE: ICD-10-CM

## 2017-07-18 LAB
AMPHETAMINES UR QL SCN: ABNORMAL
BARBITURATES UR QL: ABNORMAL
BENZODIAZ UR QL: ABNORMAL
CANNABINOIDS UR QL SCN: ABNORMAL
COCAINE UR QL: ABNORMAL
ETHANOL UR QL SCN: ABNORMAL
OPIATES UR QL SCN: ABNORMAL
PCP UR QL SCN: ABNORMAL

## 2017-07-18 PROCEDURE — 99211 OFF/OP EST MAY X REQ PHY/QHP: CPT | Mod: ZF

## 2017-07-18 PROCEDURE — 80307 DRUG TEST PRSMV CHEM ANLYZR: CPT | Performed by: INTERNAL MEDICINE

## 2017-07-18 PROCEDURE — 80320 DRUG SCREEN QUANTALCOHOLS: CPT | Performed by: INTERNAL MEDICINE

## 2017-07-18 NOTE — PATIENT INSTRUCTIONS
[facility]      Treating Insomnia  Good sleeping habits are a key part of treatment. If needed, some medications may help you sleep better at first. Making healthy lifestyle changes and learning to relax can improve your sleep. Treating insomnia takes commitment, but trust that your efforts will pay off. Talk to your doctor before taking any medication.    Healthy Lifestyle  Your lifestyle affects your health and your sleep. Here are some healthy habits:    Keep a regular sleep schedule. Go to bed and get up at the same time each day.    Exercise regularly. It may help you reduce stress. Avoid strenuous exercise for two to four hours before bedtime.    Avoid or limit naps.    Use your bed only for sleep and sex.    Don t spend too much time in bed trying to fall asleep. If you can t fall asleep, get up and do something until you become tired and drowsy.    Avoid or limit caffeine and nicotine. They can keep you awake at night. Also avoid alcohol. It may help you fall asleep at first, but your sleep will not be restful.    Before Bedtime  To sleep better every night, try these tips:    Have a bedtime routine to let your body and mind know when it s time to sleep.    Going to bed should be relaxing so try to do only relaxing things around bedtime. Sleep will come sooner.    If your worries don t let you sleep, write them down in a diary. Then close it, and go to bed.    Make sure the room is not too hot or too cold. If it s not dark enough, an eye mask can help. If it s noisy, try using earplugs.    Learn to Relax  Stress, anxiety, and body tension may keep you awake at night. To unwind before bedtime, try reading a book, meditation, or yoga. Also, try the following:    Deep breathing. Sit or lie back in a chair. Take a slow, deep breath. Hold it for 5 counts. Then breathe out slowly through your mouth. Keep doing this until you feel relaxed.    Imagery. Think of the last fun trip you took. In your  mind, walk through the trip from start to finish. Put as much detail into the memory as you can remember. It will help you relax.    Cognitive Behavioral Treatment (CBT)  CBT is the most effective treatment for long-term insomnia. It tries to address the underlying causes of your sleep problems, including your habits and how you think about sleep.      Individual Therapy   Robert Mcintyre    Sleep psychologist, [facility] [place], [place]    Online Programs     www.[employer] (pronounced shut eye). There is a fee for this program. Enter the code  [facility]  if you decide to enroll in this program.      www.sleepIO.com (pronounced sleep ee oh). There is a fee for this program. Enter the code  [facility]  if you decide to enroll in this program.     Suggested Resources  Insomnia Treatment Books     Overcoming Insomnia by Demetris Durham and Anupama Trivedi (2008)    No More Sleepless Nights by Dimas Neal and Allyson Underwood (1996)    Say Adrianna to Insomnia by Pierre Holley (2009)    The Insomnia Workbook by Anna Howard and Hugo Waite (2009)    The Insomnia Answer by Blair Orellana and Real Mccord (2006)      Stress Management and Relaxation Books    The Relaxation and Stress Reduction Workbook by Laurel Gardner, Keira Campos and Cornel Singer (2008)    Stress Management Workbook: Techniques and Self-Assessment Procedures by Cathy Madison and Lizandro Romero (1997)    A Mindfulness-Based Stress Reduction Workbook by Haider Sanchez and Gabriella Ortez (2010)    The Complete Stress Management Workbook by Jerad Howard, Salvador Low and Waylon Whitt (1996)    Assert Yourself by Yanelis Thompson and Kong Thompson (1977)    Relaxation Resources for Computer Download   These websites offer resources to help you relax. This list is for information only. [facility] is not responsible for the quality of services or the actions of any person or organization.  Progressive  Muscle Relaxation (PMR):     http://www.Peak/progressive-muscle-relaxation-exercise.html     http://studentsupport.[facility]/counseling/resources/self-help/relaxation-and-stress-management/   Deep Breathing Exercises:    http://www.Peak/breathing-awareness.html     Meditation:     wwwDarkstrand    www.nlyte SoftwaremeditationSqueakeesite.Telematik You may have to pay for some of these resources.    Guided Imagery:    http://www.Peak/guided-imagery-scripts.html     http://bLife/library/bfhhnfqiwy-qihwoc-hvwyqwf/     Counseling / Behavioral Health  [facility]  Visit www.GlobalLogic.org or call [phone] to find a clinic close to you.      This is not a prescription and these resources are optional. You must pay for any costs when using these resources. Please ask your insurance carrier if you can be reimbursed for these resources. If so, you are responsible for sending the needed details to your insurance carrier. These resources may also be tax deductible as medical expenses. Check with your .     These programs and publications are not affiliated in any way with [facility].    Please do not drive if drowsy or sleepy;  pull over if drowsy.

## 2017-07-18 NOTE — PROGRESS NOTES
Sleep medicine consultation visit note  date of visit 718 2017  purpose of visit insomnia  history of present illness patient is a 21-year-old female who presents to the sleep clinic today. Chief complaints of insomnia. She reports concerns about both difficulty falling asleep and staying asleep for the past few weeks ever since she returned from her trip from [place]. She has never been someone who has leapt a full 8 hours while in during college. She had problems with insomnia from time to time but not told this magnitude which she has been experiencing since her return from [place].    After she finished her finals she went on a two-week trip to [place] to visit her friend. She reported that her trip was gated she had a good time she returned home on Wednesday and had to start her job the following Monday. Ever since her return she has been having significant trouble falling asleep and staying asleep she tries to go to bed on the weekdays at 10 PM but is unable to fall asleep until 3 to 4 hours later during this time she just lies down in the bed tries to fall asleep she reads in the bed, watches TV in the bed watches Netflix in the bad has on occasions use noise noise gracie on the phone. She had tried melatonin for a week she wasn't able to tell me the strength of the medication it made her tired and she was able to fall asleep but she would still wake up during the night. During the weekend her bedtime is around 1 AM she still takes at least 2 hours to fall asleep from that time and she wakes up on her own at 12 noon spontaneously however during the week days she wakes up with an alarm between 730 to 8 AM. She reports 325 nocturnal awakenings mostly due to nightmares and this has been a concern that has been becoming very frequent and frustrating to her since her return from [place]. In the past she would have occasional nightmares but she never really pay too much attention about that not to think  "anything too much about it but bad dreams and nightmares have been really bothering her to the point that she is afraid to go to bed because she was going to have a nightmare. When she wakes up from a nightmare she doesn't like she cannot fall asleep for at least 20 to 60 minutes after the awakening and she also doesn't like to fall asleep quickly because the same team of the nightmare would continue than. Palpitations, pounding heart, or accelerated heart rate  ?Sweating  ?Sensations of shortness of breath       ?Nausea or abdominal distress  ?Feeling dizzy, unsteady, light-headed, or faint     ?Paresthesias (numbness or tingling sensations)  ?Fear of losing control or \"going crazyShe does recollect the nightmares and the dreams in the morning when she wakes up most times she has been only getting 3 to 4 hours of sleep lately on the weekdays and 62 hours of sleep on the weekends she is tired as the day goes by and reports excessive daytime sleepiness endorsing an upper stimulus score of 17 out of 24. She denies X thousand as well driving.    She was recently prescribed trazodone by her primary care provider for the insomnia concerns 25 mg 1 tablet wasn't helpful so she increase the dose to 25 mg 2 tablets before bed with the medication she has been able to fall asleep easily and stay asleep for a longer period of time but she has been having bad dreams and nightmares different teams.    Are nightmares including dreams about 9/11 and bombs killing etc. in the past when she took sleep aids that is equal she is to have nightmares again so she is hesitant to take any sort of sleep aids. 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 Last night to give an example where she was almost rolling of the bed and her feet almost were out of the bed she things that she has she reports dreaming as if she got trapped in a situation is trying to run    She clenches and grinds her teeth but does not use a mock court    She denies snoring no reports of witnessing Nesting sleep no snob arousals are awakenings gasping for air occupant sensation or problems begins with the names she has occasional dryness and multiple awakening and she has occasional morning headaches recently they have increased enough frequency. He/she has acid reflux symptoms she hates sleeping on her packets uncomfortable paragraph no A but she does report symptoms adjust of sleep paralysis and she also reports that hallucinations hearing voices. She reported one incident recently while she was at her work that she was hearing noises as if there was banking and she would jump hearing those noises and the setup where she works is pretty quiet and her coworker saw that and asked her if she was okay if she would like to go home and she attributes that the hallucinations to probably not getting adequate amount of sleep.    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 She denies having use alcohol as a sleep aid.    Medical history anxiety and depression in the past she was on Zoloft while you are she was in high school but she discontinued the medication paragraph allergies no known drug allergies paragraph review of systems headaches shortness of breath or shortness of breath with activity related to nightmares 5/recent stool abdominal pain muscle pains depression and anxiety but denies suicidal ideation support thoughts of harming others    Physical examination wide is as divinatory MR general appearance normal in no apparent cortical minority distress snow smoked out slightly hypertrophied inferior nasal turbinates in both nostrils but painted for the both the nostrils oropharynx Elyse Kimmy past 3 with a low taping soft palate no tonsillar hypertrophy medical exam circumference was 13 1/2 inches will tyrannically please give the rest of the example ankle dictated later    Impression/reports/plan number 1 insomnia problems with difficulty falling asleep and staying asleep that has gotten worse and ever since the return trip from [place]. She attributes the insomnia are to the nightmares the symptoms are suggested of psychophysiological insomnia she also has circuit and rhythm sleep wake disorders/delayed sleep phase, inadequate sleep hygiene, underlying mood disorders anxiety and depression which can also contribute to symptoms of insomnia as well as history of  substance abuse particularly using marijuana are more frequently since she had been in [place]. With regards to the nightmares and the bad dreams she denies any history of prior abuse which could suggest 2 of PTSD.    We discussed optimizing sleep hygiene measures avoiding activities like reading in the bad and using Netflix and the bad or watching TV in the bed. The also discoloration was recommended to keep 8 AM answer standard wake-up time and go to bed around 2 AM from tonight and gradually advance her bedtime until she reaches the goal bedtime of 12 midnight we discussed exposure to bright light produces natural outdoor light for at least a half an hour to one hour after 10 AM. She was also recommended to try my luck melatonin 1 mg 1/2 to 1 tablet at 9 PM which is 5 hours prior to her habits to sleep time which is around 2 AM. She was sister to not return after she takes the melatonin. She was recommended to stop smoking marijuana and depending on marijuana as a sleep aid. We discussed keeping a sleep schedule the same 7 days a week without changing it on the weekends and avoiding naps during the day. We discussed avoiding conception of alcohol within 2 hours close to bedtime.    I have recommended referral to sleep psychologist Dr. Robert Mcintyre for cognitive behavioral therapy due to concerns about insomnia as well as the fear about being able to fall asleep due to the nightmares.    She was recommended to follow up with her primary care provider/[facility] cad[facility] in order to optimally manage the underlying mood disorders anxiety and panic attacks    Her symptoms also are suggestive of panic attacks    Since she reports excessive debt and sleepiness I will also consider the possibility of possible sleep apnea and do a sleep study break this point of time and want to obtain urine talk screen and go with the results she wasn't certain not to drive or operate machinery machinery if she's drowsy or State [employer]  accidents dictation completed

## 2017-07-18 NOTE — MR AVS SNAPSHOT
After Visit Summary   7/18/2017    Amy Jauregui    MRN: [id number]           Patient Information     Date Of Birth          1995        Visit Information        Provider Department      7/18/2017 8:00 AM Gómez Chase MD [facility], [place], Sleep Study        Today's Diagnoses     Insomnia, unspecified type    -  1      Care Instructions    [place] [facility]      Treating Insomnia  Good sleeping habits are a key part of treatment. If needed, some medications may help you sleep better at first. Making healthy lifestyle changes and learning to relax can improve your sleep. Treating insomnia takes commitment, but trust that your efforts will pay off. Talk to your doctor before taking any medication.    Healthy Lifestyle  Your lifestyle affects your health and your sleep. Here are some healthy habits:    Keep a regular sleep schedule. Go to bed and get up at the same time each day.    Exercise regularly. It may help you reduce stress. Avoid strenuous exercise for two to four hours before bedtime.    Avoid or limit naps.    Use your bed only for sleep and sex.    Don t spend too much time in bed trying to fall asleep. If you can t fall asleep, get up and do something until you become tired and drowsy.    Avoid or limit caffeine and nicotine. They can keep you awake at night. Also avoid alcohol. It may help you fall asleep at first, but your sleep will not be restful.    Before Bedtime  To sleep better every night, try these tips:    Have a bedtime routine to let your body and mind know when it s time to sleep.    Going to bed should be relaxing so try to do only relaxing things around bedtime. Sleep will come sooner.    If your worries don t let you sleep, write them down in a diary. Then close it, and go to bed.    Make sure the room is not too hot or too cold. If it s not dark enough, an eye mask can help. If it s noisy, try using earplugs.    Learn to  Relax  Stress, anxiety, and body tension may keep you awake at night. To unwind before bedtime, try reading a book, meditation, or yoga. Also, try the following:    Deep breathing. Sit or lie back in a chair. Take a slow, deep breath. Hold it for 5 counts. Then breathe out slowly through your mouth. Keep doing this until you feel relaxed.    Imagery. Think of the last fun trip you took. In your mind, walk through the trip from start to finish. Put as much detail into the memory as you can remember. It will help you relax.    Cognitive Behavioral Treatment (CBT)  CBT is the most effective treatment for long-term insomnia. It tries to address the underlying causes of your sleep problems, including your habits and how you think about sleep.      Individual Therapy   Robert Mcintyre    Sleep psychologist, [facility] [place], [place]    Online Programs     www.[address] [employer] (pronounced shut eye). There is a fee for this program. Enter the code  Medpricer.com  if you decide to enroll in this program.      www.sleepIO.com (pronounced sleep ee oh). There is a fee for this program. Enter the code  Medpricer.com  if you decide to enroll in this program.     Suggested Resources  Insomnia Treatment Books     Overcoming Insomnia by Demetris Durham and Anupama Trivedi (2008)    No More Sleepless Nights by Dimas Neal and Allyson Underwood (1996)    Say Adrianna to Insomnia by Pierre Holley (2009)    The Insomnia Workbook by Anna Howard and Hugo Waite (2009)    The Insomnia Answer by Blair Orellana and Real Mccord (2006)      Stress Management and Relaxation Books    The Relaxation and Stress Reduction Workbook by Laurel Gardner, Keira Campos and Cornel Singer (2008)    Stress Management Workbook: Techniques and Self-Assessment Procedures by Cathy Madison and Lizandro Romero (1997)    A Mindfulness-Based Stress Reduction Workbook by Haider Sanchez and Gabriella Ortez (2010)    The Complete Stress  Management Workbook by Jerad Howard, Salvador Low and Waylon Whitt (1996)    Assert Yourself by Yanelis Thompson and Kong Thompson (1977)    Relaxation Resources for Computer Download   These websites offer resources to help you relax. This list is for information only. [facility] is not responsible for the quality of services or the actions of any person or organization.  Progressive Muscle Relaxation (PMR):     http://www.[employer]/progressive-muscle-relaxation-exercise.html     http://studentsupport.[facility]/counseling/resources/self-help/relaxation-and-stress-management/   Deep Breathing Exercises:    http://www.[employer]/breathing-awareness.html     Meditation:     wwwRentamus    www.[employer] MediaguidedMassively FunmeditationMassively Funsite.[employer] You may have to pay for some of these resources.    Guided Imagery:    http://www.[employer]/guided-imagery-scripts.html     http://[employer]/library/znuszkgbfc-cvgxxj-knggdqo/     Counseling / Behavioral Health  [facility]  Visit www.[place].org or call [phone] to find a clinic close to you.      This is not a prescription and these resources are optional. You must pay for any costs when using these resources. Please ask your insurance carrier if you can be reimbursed for these resources. If so, you are responsible for sending the needed details to your insurance carrier. These resources may also be tax deductible as medical expenses. Check with your .     These programs and publications are not affiliated in any way with [facility].    Please do not drive if drowsy or sleepy;  pull over if drowsy.            Follow-ups after your visit        Additional Services     SLEEP PSYCHOLOGY REFERRAL       Referral Urgency: 24 - 48 hours    Dr. Robert Mcintyre is at the following clinics on the following days:  [address]        Thursday and  Friday (PLEASE CALL [phone] to schedule an appointment).  [facility] - [address] [place]       Wednesdays   (PLEASE CALL [phone] to schedule an appointment).     Please be aware that coverage of these services is subject to the terms and limitations of your health insurance plan. Call member services at your health plan with any benefit or coverage questions.     Please bring the following to your appointment:  >> List of current medications   >> This referral request   >> Any documents/labs given to you for this referral                  Follow-up notes from your care team     Return in about 2 weeks (around 8/1/2017).      Who to contact     If you have questions or need follow up information about today's clinic visit or your schedule please contact [facility] directly at [phone].  Normal or non-critical lab and imaging results will be communicated to you by MyChart, letter or phone within 4 business days after the clinic has received the results. If you do not hear from us within 7 days, please contact the clinic through [employer] or phone. If you have a critical or abnormal lab result, we will notify you by phone as soon as possible.  Submit refill requests through M[employer]opia or call your pharmacy and they will forward the refill request to us. Please allow 3 business days for your refill to be completed.          Additional Information About Your Visit        MyChart Information     M[employer]opia gives you secure access to your electronic health record. If you see a primary care provider, you can also send messages to your care team and make appointments. If you have questions, please call your primary care clinic.  If you do not have a primary care provider, please call [phone] and they will assist you.        Care EveryWhere ID     This is your Care EveryWhere ID. This could be used by other organizations to access your [facility] medical records  [id number]       "  Your Vitals Were     Pulse Respirations Height Pulse Oximetry BMI (Body Mass Index)       74 16 1.632 m (5' 4.25\") 98% 25.38 kg/m2        Blood Pressure from Last 3 Encounters:   07/18/17 122/85   06/29/17 120/76   06/26/17 110/90    Weight from Last 3 Encounters:   07/18/17 67.6 kg (149 lb)   06/29/17 67.3 kg (148 lb 6.4 oz)   06/26/17 67.4 kg (148 lb 8 oz)              We Performed the Following     SLEEP EVALUATION & MANAGEMENT REFERRAL - ADULT     SLEEP PSYCHOLOGY REFERRAL        Primary Care Provider Office Phone # Fax #    R Blair Reynolds [phone]       [place] [address]        Equal Access to Services     ARYA HODGE : Hadii aad dagoberto hadasho Soomaali, waaxda luqadaha, qaybta kaalmada adeegyada, waxay arlette hayvictor manuel wong. So [facility] [phone].    ATENCIÓN: Si habla español, tiene a souza disposición servicios gratuitos de asistencia lingüística. Sandra al [phone].    We comply with applicable federal civil rights laws and [place] laws. We do not discriminate on the basis of race, color, national origin, age, disability sex, sexual orientation or gender identity.            Thank you!     Thank you for choosing [facility]  for your care. Our goal is always to provide you with excellent care. Hearing back from our patients is one way we can continue to improve our services. Please take a few minutes to complete the written survey that you may receive in the mail after your visit with us. Thank you!             Your Updated Medication List - Protect others around you: Learn how to safely use, store and throw away your medicines at www.disposemymeds.org.          This list is accurate as of: 7/18/17 11:59 PM.  Always use your most recent med list.                   Brand Name Dispense Instructions for use Diagnosis    albuterol 108 (90 BASE) MCG/ACT Inhaler    PROAIR HFA/PROVENTIL HFA/VENTOLIN HFA    1 Inhaler    " Inhale 2 puffs into the lungs every 4 hours as needed for shortness of breath / dyspnea    Exercise-induced asthma       norgestim-eth estrad triphasic 0.18/0.215/0.25 MG-35 MCG per tablet    TRINESSA (28)    84 tablet    Take 1 tablet by mouth daily    Dysmenorrhea       traZODone 50 MG tablet    DESYREL    30 tablet    Take 0.5-1 tablets (25-50 mg) by mouth nightly as needed for sleep    Insomnia, unspecified type

## 2017-09-13 ENCOUNTER — MYC REFILL (OUTPATIENT)
Dept: FAMILY MEDICINE | Facility: CLINIC | Age: 22
End: 2017-09-13

## 2017-09-13 DIAGNOSIS — N94.6 DYSMENORRHEA: ICD-10-CM

## 2017-09-13 RX ORDER — NORGESTIMATE AND ETHINYL ESTRADIOL 7DAYSX3 28
1 KIT ORAL DAILY
Qty: 84 TABLET | Refills: 1 | Status: SHIPPED | OUTPATIENT
Start: 2017-09-13 | End: 2017-12-13

## 2017-09-13 NOTE — TELEPHONE ENCOUNTER
Message from [employer]:  Original authorizing provider: MD Amy Bonilla would like a refill of the following medications:  norgestim-eth estrad triphasic (TRINESSA, 28,) 0.18/0.215/0.25 MG-35 MCG per tablet [MARYCHUY Reynolds MD]    Preferred pharmacy: [facility] - [place], [place] - [address] [place] [address]    Comment:

## 2017-09-22 NOTE — PROGRESS NOTES
SUBJECTIVE:   CC: Amy Jauregui is an 21 year old woman who presents for preventive health visit.     Healthy Habits:    Do you get at least three servings of calcium containing foods daily (dairy, green leafy vegetables, etc.)? yes    Amount of exercise or daily activities, outside of work: 1 day(s) per week    Problems taking medications regularly No    Medication side effects: No    Have you had an eye exam in the past two years? no    Do you see a dentist twice per year? yes    Do you have sleep apnea, excessive snoring or daytime drowsiness?no        Depression and Anxiety Follow-Up    Status since last visit: Anxiety is bad but not unbearable     Other associated symptoms:None    Complicating factors:     Significant life event: No     Current substance abuse: None    Trazodone is working ok. Denies getting nightmares anymore  Needs the referral to the sleep specialist  Admits its her senior year of college so her anxiety is high  She was on zoloft in high school    Also reports having bad acid reflux  Drinks alcohol on the weekend  Usually gets symptoms around dinner time. Sometimes before she eats  Worse some weeks  Exercises by walking    She may be interested in started anxiety medication but reports overall her anxiety is tolerable.     PHQ-9 SCORE 6/1/2016 12/22/2016 6/26/2017   Total Score - - -   Total Score 2 0 10     GURU-7 SCORE 11/5/2015 6/1/2016 6/26/2017   Total Score - - -   Total Score 11 6 6       PHQ-9  English  PHQ-9   Any Language  GAD7        Today's PHQ-2 Score:   PHQ-2 ( 1999 Pfizer) 12/22/2016 6/1/2016   Q1: Little interest or pleasure in doing things 0 0   Q2: Feeling down, depressed or hopeless 0 0   PHQ-2 Score 0 0         Abuse: Current or Past(Physical, Sexual or Emotional)- No  Do you feel safe in your environment - Yes  Social History   Substance Use Topics     Smoking status: Never Smoker     Smokeless tobacco: Never Used     Alcohol use Yes      Comment: occ     The  patient does not drink >3 drinks per day nor >7 drinks per week.    Reviewed orders with patient.  Reviewed health maintenance and updated orders accordingly - Yes  Labs reviewed in EPIC  BP Readings from Last 3 Encounters:   09/25/17 132/78   07/18/17 122/85   06/29/17 120/76    Wt Readings from Last 3 Encounters:   09/25/17 146 lb 3.2 oz (66.3 kg)   07/18/17 149 lb (67.6 kg)   06/29/17 148 lb 6.4 oz (67.3 kg)                      Mammogram not appropriate for this patient based on age.    Pertinent mammograms are reviewed under the imaging tab.  History of abnormal Pap smear: NO - age 21-29 PAP every 3 years recommended    Reviewed and updated as needed this visit by clinical staff  Tobacco  Meds         Reviewed and updated as needed this visit by Provider            ROS:  C: NEGATIVE for fever, chills, change in weight  I: NEGATIVE for worrisome rashes, moles or lesions  E: NEGATIVE for vision changes or irritation  ENT: NEGATIVE for ear, mouth and throat problems  R: NEGATIVE for significant cough or SOB  B: NEGATIVE for masses, tenderness or discharge  CV: NEGATIVE for chest pain, palpitations or peripheral edema  GI: NEGATIVE for diarrhea, dysphagia, hematemesis, hematochezia, hemorrhoids, jaundice, melena, nausea, poor appetite, vomiting and weight loss  : NEGATIVE for unusual urinary or vaginal symptoms. Periods are regular.  M: NEGATIVE for significant arthralgias or myalgia  N: NEGATIVE for weakness, dizziness or paresthesias  E: NEGATIVE for temperature intolerance, skin/hair changes  PSYCHIATRIC: NEGATIVE fordelusions, hallucinations, thoughts of hurting someone else and thoughts of self harm    OBJECTIVE:   /78  Pulse 90  Temp 97.9  F (36.6  C) (Oral)  Wt 146 lb 3.2 oz (66.3 kg)  SpO2 98%  BMI 24.9 kg/m2  EXAM:  GENERAL: healthy, alert and no distress  EYES: Eyes grossly normal to inspection, PERRL and conjunctivae and sclerae normal  HENT: ear canals and TM's normal, nose and mouth  without ulcers or lesions  NECK: no adenopathy, no asymmetry, masses, or scars and thyroid normal to palpation  RESP: lungs clear to auscultation - no rales, rhonchi or wheezes  CV: regular rate and rhythm, normal S1 S2, no S3 or S4, no murmur, click or rub, no peripheral edema and peripheral pulses strong  ABDOMEN: soft, nontender, no hepatosplenomegaly, no masses and bowel sounds normal  MS: no gross musculoskeletal defects noted, no edema  SKIN: no suspicious lesions or rashes  NEURO: Normal strength and tone, mentation intact and speech normal  PSYCH: mentation appears normal, affect normal/bright    ASSESSMENT/PLAN:       ICD-10-CM    1. Encounter for routine adult medical exam with abnormal findings Z00.01    2. GURU (generalized anxiety disorder) F41.1 MENTAL HEALTH REFERRAL     OFFICE/OUTPT VISIT,EST,LEVL III   3. Gastroesophageal reflux disease without esophagitis K21.9 H Pylori antigen, stool     OFFICE/OUTPT VISIT,EST,LEVL III   4. Insomnia, unspecified type G47.00      Follow up with therapist for anxiety. We'll hold off on medications for now. See patient instructions for GERD. Number for sleep psychologist given. Follow up in 3 months. Return to clinic for any new or worsening symptoms or go to ER Urgent care in off hours    Patient Instructions   Sleep psychologist: Dr. Robert Mcintyre is at the following clinics on the following days:  [facility] - [address] [place], [place]        Thursday and Friday (PLEASE CALL [phone] to schedule an appointment).  [facility] [address] [facility] [place]       Wednesdays   (PLEASE CALL [phone] to schedule an appointment).    Take vitamin D3 2,000 IU's daily in the winter  Follow up with Jen Rebolledo for counseling    Give up alcohol, caffeine, chocolate, peppermint and spicy foods for 2 weeks straight  DGL daily or pepto bismul for 2 weeks   Then if you still have symptoms 2 weeks of zantac 150 mg twice   If still with  "issues, prilosec 20 mg once daily for 2 weeks    Exercise regularly  5-7 servings of fruits and veggies, ok to add metamucil daily if unable to   64 ounces of water daily  Return to clinic for any new or worsening symptoms or go to ER Urgent care in off hours        Preventive Health Recommendations  Female Ages 18 to 25     Yearly exam:     See your health care provider every year in order to  o Review health changes.   o Discuss preventive care.    o Review your medicines if your doctor has prescribed any.      You should be tested each year for STDs (sexually transmitted diseases).       After age 20, talk to your provider about how often you should have cholesterol testing.      Starting at age 21, get a Pap test every three years. If you have an abnormal result, your doctor may have you test more often.      If you are at risk for diabetes, you should have a diabetes test (fasting glucose).     Shots:     Get a flu shot each year.     Get a tetanus shot every 10 years.     Consider getting the shot (vaccine) that prevents cervical cancer (Gardasil).    Nutrition:     Eat at least 5 servings of fruits and vegetables each day.    Eat whole-grain bread, whole-wheat pasta and brown rice instead of white grains and rice.    Talk to your provider about Calcium and Vitamin D.     Lifestyle    Exercise at least 150 minutes a week each week (30 minutes a day, 5 days a week). This will help you control your weight and prevent disease.    Limit alcohol to one drink per day.    No smoking.     Wear sunscreen to prevent skin cancer.    See your dentist every six months for an exam and cleaning.      COUNSELING:   Reviewed preventive health counseling, as reflected in patient instructions         reports that she has never smoked. She has never used smokeless tobacco.    Estimated body mass index is 24.9 kg/(m^2) as calculated from the following:    Height as of 7/18/17: 5' 4.25\" (1.632 m).    Weight as of this encounter: " 146 lb 3.2 oz (66.3 kg).       Counseling Resources:  ATP IV Guidelines  Pooled Cohorts Equation Calculator  Breast Cancer Risk Calculator  FRAX Risk Assessment  ICSI Preventive Guidelines  Dietary Guidelines for Americans, 2010  USDA's MyPlate  ASA Prophylaxis  Lung CA Screening    Marie Hearn PA-C  [facility]

## 2017-09-25 ENCOUNTER — OFFICE VISIT (OUTPATIENT)
Dept: FAMILY MEDICINE | Facility: CLINIC | Age: 22
End: 2017-09-25
Payer: COMMERCIAL

## 2017-09-25 VITALS
DIASTOLIC BLOOD PRESSURE: 78 MMHG | TEMPERATURE: 97.9 F | BODY MASS INDEX: 24.9 KG/M2 | HEART RATE: 90 BPM | WEIGHT: 146.2 LBS | OXYGEN SATURATION: 98 % | SYSTOLIC BLOOD PRESSURE: 132 MMHG

## 2017-09-25 DIAGNOSIS — G47.00 INSOMNIA, UNSPECIFIED TYPE: ICD-10-CM

## 2017-09-25 DIAGNOSIS — F41.1 GAD (GENERALIZED ANXIETY DISORDER): ICD-10-CM

## 2017-09-25 DIAGNOSIS — Z00.01 ENCOUNTER FOR ROUTINE ADULT MEDICAL EXAM WITH ABNORMAL FINDINGS: Primary | ICD-10-CM

## 2017-09-25 DIAGNOSIS — K21.9 GASTROESOPHAGEAL REFLUX DISEASE WITHOUT ESOPHAGITIS: ICD-10-CM

## 2017-09-25 PROCEDURE — 99395 PREV VISIT EST AGE 18-39: CPT | Performed by: PHYSICIAN ASSISTANT

## 2017-09-25 PROCEDURE — 99213 OFFICE O/P EST LOW 20 MIN: CPT | Mod: 25 | Performed by: PHYSICIAN ASSISTANT

## 2017-09-25 NOTE — MR AVS SNAPSHOT
After Visit Summary   9/25/2017    Amy Jauregui    MRN: [id number]           Patient Information     Date Of Birth          1995        Visit Information        Provider Department      9/25/2017 2:20 PM Marie Hearn PA-C [facility]        Today's Diagnoses     GURU (generalized anxiety disorder)    -  1    Routine general medical examination at a health care facility        Encounter for routine adult medical exam with abnormal findings          Care Instructions    Sleep psychologist: Dr. Robert Mcintyre is at the following clinics on the following days:  [facility] - [address], [place], [place]        Thursday and Friday (PLEASE CALL [phone] to schedule an appointment).  [facility] [address] [place]       Wednesdays   (PLEASE CALL [phone] to schedule an appointment).    Take vitamin D3 2,000 IU's daily in the winter  Follow up with Jen Rebolledo for counseling    Give up alcohol, caffeine, chocolate, peppermint and spicy foods for 2 weeks straight  DGL daily or pepto bismul for 2 weeks   Then if you still have symptoms 2 weeks of zantac 150 mg twice   If still with issues, prilosec 20 mg once daily for 2 weeks    Exercise regularly  5-7 servings of fruits and veggies, ok to add metamucil daily if unable to   64 ounces of water daily  Return to clinic for any new or worsening symptoms or go to ER Urgent care in off hours        Preventive Health Recommendations  Female Ages 18 to 25     Yearly exam:     See your health care provider every year in order to  o Review health changes.   o Discuss preventive care.    o Review your medicines if your doctor has prescribed any.      You should be tested each year for STDs (sexually transmitted diseases).       After age 20, talk to your provider about how often you should have cholesterol testing.      Starting at age 21, get a Pap test every three years. If you have  an abnormal result, your doctor may have you test more often.      If you are at risk for diabetes, you should have a diabetes test (fasting glucose).     Shots:     Get a flu shot each year.     Get a tetanus shot every 10 years.     Consider getting the shot (vaccine) that prevents cervical cancer (Gardasil).    Nutrition:     Eat at least 5 servings of fruits and vegetables each day.    Eat whole-grain bread, whole-wheat pasta and brown rice instead of white grains and rice.    Talk to your provider about Calcium and Vitamin D.     Lifestyle    Exercise at least 150 minutes a week each week (30 minutes a day, 5 days a week). This will help you control your weight and prevent disease.    Limit alcohol to one drink per day.    No smoking.     Wear sunscreen to prevent skin cancer.    See your dentist every six months for an exam and cleaning.          Follow-ups after your visit        Additional Services     MENTAL HEALTH REFERRAL       Your provider has referred you to: FMG: [facility] - Counseling (Individual/Couples/Family) - [facility] [phone]   [email or web address]/   *Patient will be contacted by [facility]'s scheduling partner, [facility] ([facility]), to schedule an appointment.  Patients may also call [facility] to schedule.    All scheduling is subject to the client's specific insurance plan & benefits, provider/location availability, and provider clinical specialities.  Please arrive 15 minutes early for your first appointment and bring your completed paperwork.    Please be aware that coverage of these services is subject to the terms and limitations of your health insurance plan.  Call member services at your health plan with any benefit or coverage questions.                  Who to contact     If you have questions or need follow up information about today's clinic visit or your schedule please contact  [facility] directly at [phone].  Normal or non-critical lab and imaging results will be communicated to you by MyChart, letter or phone within 4 business days after the clinic has received the results. If you do not hear from us within 7 days, please contact the clinic through MyChart or phone. If you have a critical or abnormal lab result, we will notify you by phone as soon as possible.  Submit refill requests through Moglue or call your pharmacy and they will forward the refill request to us. Please allow 3 business days for your refill to be completed.          Additional Information About Your Visit        [employer] Information     Moglue gives you secure access to your electronic health record. If you see a primary care provider, you can also send messages to your care team and make appointments. If you have questions, please call your primary care clinic.  If you do not have a primary care provider, please call [phone] and they will assist you.        Care EveryWhere ID     This is your Care EveryWhere ID. This could be used by other organizations to access your [place] medical records  [id number]         Blood Pressure from Last 3 Encounters:   07/18/17 122/85   06/29/17 120/76   06/26/17 110/90    Weight from Last 3 Encounters:   07/18/17 149 lb (67.6 kg)   06/29/17 148 lb 6.4 oz (67.3 kg)   06/26/17 148 lb 8 oz (67.4 kg)              We Performed the Following     MENTAL HEALTH REFERRAL        Primary Care Provider Office Phone # Fax #    R Blair Reynolds [phone] [address] [facility] [address]        Equal Access to Services     [facility]: Hadii aad ku hadasho Soomaali, waaxda luqadaha, qaybta kaalmada vinay, joe wong. So [facility] [phone].    ATENCIÓN: Si habla español, tiene a souza disposición servicios gratuitos de asistencia lingüística. Llame al [phone].    We comply with applicable federal  civil rights laws and [place] laws. We do not discriminate on the basis of race, color, national origin, age, disability sex, sexual orientation or gender identity.            Thank you!     Thank you for choosing [facility]  for your care. Our goal is always to provide you with excellent care. Hearing back from our patients is one way we can continue to improve our services. Please take a few minutes to complete the written survey that you may receive in the mail after your visit with us. Thank you!             Your Updated Medication List - Protect others around you: Learn how to safely use, store and throw away your medicines at www.disposemymeds.org.          This list is accurate as of: 9/25/17  2:40 PM.  Always use your most recent med list.                   Brand Name Dispense Instructions for use Diagnosis    albuterol 108 (90 BASE) MCG/ACT Inhaler    PROAIR HFA/PROVENTIL HFA/VENTOLIN HFA    1 Inhaler    Inhale 2 puffs into the lungs every 4 hours as needed for shortness of breath / dyspnea    Exercise-induced asthma       norgestim-eth estrad triphasic 0.18/0.215/0.25 MG-35 MCG per tablet    TRINESSA (28)    84 tablet    Take 1 tablet by mouth daily    Dysmenorrhea       traZODone 50 MG tablet    DESYREL    30 tablet    Take 0.5-1 tablets (25-50 mg) by mouth nightly as needed for sleep    Insomnia, unspecified type

## 2017-09-25 NOTE — NURSING NOTE
"Chief Complaint   Patient presents with     Physical     Depression       Initial /78  Pulse 90  Temp 97.9  F (36.6  C) (Oral)  Wt 146 lb 3.2 oz (66.3 kg)  SpO2 98%  BMI 24.9 kg/m2 Estimated body mass index is 24.9 kg/(m^2) as calculated from the following:    Height as of 7/18/17: 5' 4.25\" (1.632 m).    Weight as of this encounter: 146 lb 3.2 oz (66.3 kg).  Medication Reconciliation: complete     Lisa Smith MA      "

## 2017-12-13 ENCOUNTER — MYC REFILL (OUTPATIENT)
Dept: FAMILY MEDICINE | Facility: CLINIC | Age: 22
End: 2017-12-13

## 2017-12-13 DIAGNOSIS — N94.6 DYSMENORRHEA: ICD-10-CM

## 2017-12-13 RX ORDER — NORGESTIMATE AND ETHINYL ESTRADIOL 7DAYSX3 28
1 KIT ORAL DAILY
Qty: 28 TABLET | Refills: 0 | Status: SHIPPED | OUTPATIENT
Start: 2017-12-13 | End: 2018-01-10

## 2017-12-13 NOTE — TELEPHONE ENCOUNTER
She was last seen a year ago.  Pap was abnormal.  She is due for an office visit and also Pap follow-up.  Will refill ×1 month.  No further refills without an office visit.

## 2017-12-13 NOTE — TELEPHONE ENCOUNTER
Message from [employer]:  Original authorizing provider: MD Amy Bonilla would like a refill of the following medications:  norgestim-eth estrad triphasic (TRINESSA, 28,) 0.18/0.215/0.25 MG-35 MCG per tablet [MARYCHUY Reynolds MD]    Preferred pharmacy: [facility] - [place], [place] - [address] [place] [address]    Comment:

## 2017-12-13 NOTE — TELEPHONE ENCOUNTER
Routing refill request to provider for review/approval because: Will send to covering provider  Patient due for pap   Last pap:  PAP (Abnormal) 12/22/2016 12:00 AM 88   LSIL   Copath Report       Notes Recorded by Elizabeth Rashid RN on 12/28/2016 at 5:19 AM  Please notify patient of LSIL pap results with recommendations to return in 1 year for next Annual Exam with repeat pap smear.    Thank you  Nakita CESPEDES RN

## 2018-01-03 ENCOUNTER — MYC MEDICAL ADVICE (OUTPATIENT)
Dept: FAMILY MEDICINE | Facility: CLINIC | Age: 23
End: 2018-01-03

## 2018-01-08 ENCOUNTER — OFFICE VISIT (OUTPATIENT)
Dept: FAMILY MEDICINE | Facility: CLINIC | Age: 23
End: 2018-01-08
Payer: COMMERCIAL

## 2018-01-08 VITALS
SYSTOLIC BLOOD PRESSURE: 132 MMHG | WEIGHT: 143.1 LBS | TEMPERATURE: 98.1 F | BODY MASS INDEX: 24.37 KG/M2 | OXYGEN SATURATION: 98 % | DIASTOLIC BLOOD PRESSURE: 88 MMHG | HEART RATE: 85 BPM

## 2018-01-08 DIAGNOSIS — F41.1 GAD (GENERALIZED ANXIETY DISORDER): Primary | ICD-10-CM

## 2018-01-08 DIAGNOSIS — E55.9 VITAMIN D DEFICIENCY: ICD-10-CM

## 2018-01-08 DIAGNOSIS — K21.9 GASTROESOPHAGEAL REFLUX DISEASE, ESOPHAGITIS PRESENCE NOT SPECIFIED: ICD-10-CM

## 2018-01-08 DIAGNOSIS — F33.1 MAJOR DEPRESSIVE DISORDER, RECURRENT EPISODE, MODERATE (H): ICD-10-CM

## 2018-01-08 PROCEDURE — 99214 OFFICE O/P EST MOD 30 MIN: CPT | Performed by: PHYSICIAN ASSISTANT

## 2018-01-08 PROCEDURE — 82306 VITAMIN D 25 HYDROXY: CPT | Performed by: PHYSICIAN ASSISTANT

## 2018-01-08 PROCEDURE — 36415 COLL VENOUS BLD VENIPUNCTURE: CPT | Performed by: PHYSICIAN ASSISTANT

## 2018-01-08 ASSESSMENT — PATIENT HEALTH QUESTIONNAIRE - PHQ9
SUM OF ALL RESPONSES TO PHQ QUESTIONS 1-9: 9
5. POOR APPETITE OR OVEREATING: MORE THAN HALF THE DAYS

## 2018-01-08 ASSESSMENT — ANXIETY QUESTIONNAIRES
1. FEELING NERVOUS, ANXIOUS, OR ON EDGE: MORE THAN HALF THE DAYS
7. FEELING AFRAID AS IF SOMETHING AWFUL MIGHT HAPPEN: SEVERAL DAYS
GAD7 TOTAL SCORE: 14
5. BEING SO RESTLESS THAT IT IS HARD TO SIT STILL: MORE THAN HALF THE DAYS
6. BECOMING EASILY ANNOYED OR IRRITABLE: MORE THAN HALF THE DAYS
3. WORRYING TOO MUCH ABOUT DIFFERENT THINGS: MORE THAN HALF THE DAYS
2. NOT BEING ABLE TO STOP OR CONTROL WORRYING: NEARLY EVERY DAY

## 2018-01-08 NOTE — MR AVS SNAPSHOT
After Visit Summary   1/8/2018    Amy Jauregui    MRN: [id number]           Patient Information     Date Of Birth          1995        Visit Information        Provider Department      1/8/2018 1:00 PM Marie Hearn PA-C [facility]        Today's Diagnoses     GURU (generalized anxiety disorder)    -  1    Major depressive disorder, recurrent episode, moderate (H)        Gastroesophageal reflux disease, esophagitis presence not specified        Vitamin D deficiency          Care Instructions    tums or zantac as needed after 2 week course of prilosec  Start on zoloft  Follow up with therapist. Look at [employer] website to look at profiles.   Recheck in 5 weeks  Return to clinic for any new or worsening symptoms or go to ER Urgent care in off hours            Follow-ups after your visit        Additional Services     MENTAL HEALTH REFERRAL  - Adult; Outpatient Treatment; Individual/Couples/Family/Group Therapy/Health Psychology; FMG: [facility] [phone]; We will contact you to schedule the appointment or please call with any questions       All scheduling is subject to the client's specific insurance plan & benefits, provider/location availability, and provider clinical specialities.  Please arrive 15 minutes early for your first appointment and bring your completed paperwork.    Please be aware that coverage of these services is subject to the terms and limitations of your health insurance plan.  Call member services at your health plan with any benefit or coverage questions.                            Your next 10 appointments already scheduled     Vikash 10, 2018 12:40 PM CST   SHORT with Cirilo Delong MD   [facility]    [address]  [facility] [id number] [phone]              Who to contact     If you have questions or need follow up information about today's  clinic visit or your schedule please contact [facility] directly at [phone].  Normal or non-critical lab and imaging results will be communicated to you by MyChart, letter or phone within 4 business days after the clinic has received the results. If you do not hear from us within 7 days, please contact the clinic through [email or web address] or phone. If you have a critical or abnormal lab result, we will notify you by phone as soon as possible.  Submit refill requests through [employer] Zhaopin or call your pharmacy and they will forward the refill request to us. Please allow 3 business days for your refill to be completed.          Additional Information About Your Visit        [email or web address] Information     [employer] gives you secure access to your electronic health record. If you see a primary care provider, you can also send messages to your care team and make appointments. If you have questions, please call your primary care clinic.  If you do not have a primary care provider, please call [phone] and they will assist you.        Care EveryWhere ID     This is your Care EveryWhere ID. This could be used by other organizations to access your [place] medical records  [id number]        Your Vitals Were     Pulse Temperature Pulse Oximetry BMI (Body Mass Index)          85 98.1  F (36.7  C) (Oral) 98% 24.37 kg/m2         Blood Pressure from Last 3 Encounters:   01/08/18 132/88   09/25/17 132/78   07/18/17 122/85    Weight from Last 3 Encounters:   01/08/18 143 lb 1.6 oz (64.9 kg)   09/25/17 146 lb 3.2 oz (66.3 kg)   07/18/17 149 lb (67.6 kg)              We Performed the Following     DEPRESSION ACTION PLAN (DAP)     MENTAL HEALTH REFERRAL  - Adult; Outpatient Treatment; Individual/Couples/Family/Group Therapy/Health Psychology; FMG: [facility] [phone]; We will contact you to schedule the appointment or please call with any questions     Vitamin D Deficiency          Today's Medication Changes           These changes are accurate as of: 1/8/18  1:10 PM.  If you have any questions, ask your nurse or doctor.               Start taking these medicines.        Dose/Directions    omeprazole 20 MG CR capsule   Commonly known as:  priLOSEC   Used for:  Gastroesophageal reflux disease, esophagitis presence not specified   Started by:  Marie Hearn PA-C        Dose:  20 mg   Take 1 capsule (20 mg) by mouth daily   Quantity:  14 capsule   Refills:  0       sertraline 50 MG tablet   Commonly known as:  ZOLOFT   Used for:  Major depressive disorder, recurrent episode, moderate (H), GURU (generalized anxiety disorder)   Started by:  Marie Hearn PA-C        Take 1/2 tablet (25 mg) for 1-2 weeks, then increase to 1 tablet orally daily   Quantity:  30 tablet   Refills:  1            Where to get your medicines      These medications were sent to [address] [address]     Phone:  [phone]     omeprazole 20 MG CR capsule    sertraline 50 MG tablet                Primary Care Provider Office Phone # Fax #    R Blair Reynolds [phone] [address] [facility] [address]        Equal Access to Services     ARYA HODGE AH: Eren vigilo Soru, waaxda luqadaha, qaybta kaalmada adeegyada, joe wong. So [facility] [phone].    ATENCIÓN: Si habla español, tiene a souza disposición servicios gratuitos de asistencia lingüística. Llame al [phone].    We comply with applicable federal civil rights laws and [place] laws. We do not discriminate on the basis of race, color, national origin, age, disability, sex, sexual orientation, or gender identity.            Thank you!     Thank you for choosing [facility]  for your care. Our goal is always to provide you with excellent care. Hearing back from our patients is one way we can  continue to improve our services. Please take a few minutes to complete the written survey that you may receive in the mail after your visit with us. Thank you!             Your Updated Medication List - Protect others around you: Learn how to safely use, store and throw away your medicines at www.disposemymeds.org.          This list is accurate as of: 1/8/18  1:10 PM.  Always use your most recent med list.                   Brand Name Dispense Instructions for use Diagnosis    albuterol 108 (90 BASE) MCG/ACT Inhaler    PROAIR HFA/PROVENTIL HFA/VENTOLIN HFA    1 Inhaler    Inhale 2 puffs into the lungs every 4 hours as needed for shortness of breath / dyspnea    Exercise-induced asthma       norgestim-eth estrad triphasic 0.18/0.215/0.25 MG-35 MCG per tablet    TRINESSA (28)    28 tablet    Take 1 tablet by mouth daily    Dysmenorrhea       omeprazole 20 MG CR capsule    priLOSEC    14 capsule    Take 1 capsule (20 mg) by mouth daily    Gastroesophageal reflux disease, esophagitis presence not specified       sertraline 50 MG tablet    ZOLOFT    30 tablet    Take 1/2 tablet (25 mg) for 1-2 weeks, then increase to 1 tablet orally daily    Major depressive disorder, recurrent episode, moderate (H), GURU (generalized anxiety disorder)       traZODone 50 MG tablet    DESYREL    30 tablet    Take 0.5-1 tablets (25-50 mg) by mouth nightly as needed for sleep    Insomnia, unspecified type

## 2018-01-08 NOTE — PATIENT INSTRUCTIONS
tums or zantac as needed after 2 week course of prilosec  Start on zoloft  Follow up with therapist. Look at [facility] website to look at profiles.   Recheck in 5 weeks  Return to clinic for any new or worsening symptoms or go to ER Urgent care in off hours

## 2018-01-08 NOTE — LETTER
My Depression Action Plan  Name: Amy Jauregui   Date of Birth 1995  Date: 1/5/2018    My doctor: MARYCHUY Reynolds   My clinic: [address] [address] [phone]          GREEN    ZONE   Good Control    What it looks like:     Things are going generally well. You have normal up s and down s. You may even feel depressed from time to time, but bad moods usually last less than a day.   What you need to do:  1. Continue to care for yourself (see self care plan)  2. Check your depression survival kit and update it as needed  3. Follow your physician s recommendations including any medication.  4. Do not stop taking medication unless you consult with your physician first.           YELLOW         ZONE Getting Worse    What it looks like:     Depression is starting to interfere with your life.     It may be hard to get out of bed; you may be starting to isolate yourself from others.    Symptoms of depression are starting to last most all day and this has happened for several days.     You may have suicidal thoughts but they are not constant.   What you need to do:     1. Call your care team, your response to treatment will improve if you keep your care team informed of your progress. Yellow periods are signs an adjustment may need to be made.     2. Continue your self-care, even if you have to fake it!    3. Talk to someone in your support network    4. Open up your depression survival kit           RED    ZONE Medical Alert - Get Help    What it looks like:     Depression is seriously interfering with your life.     You may experience these or other symptoms: You can t get out of bed most days, can t work or engage in other necessary activities, you have trouble taking care of basic hygiene, or basic responsibilities, thoughts of suicide or death that will not go away, self-injurious behavior.     What you need to do:  1. Call your care  team and request a same-day appointment. If they are not available (weekends or after hours) call your local crisis line, emergency room or 911.      Electronically signed by: Lisa Smith, January 5, 2018    Depression Self Care Plan / Survival Kit    Self-Care for Depression  Here s the deal. Your body and mind are really not as separate as most people think.  What you do and think affects how you feel and how you feel influences what you do and think. This means if you do things that people who feel good do, it will help you feel better.  Sometimes this is all it takes.  There is also a place for medication and therapy depending on how severe your depression is, so be sure to consult with your medical provider and/ or Behavioral Health Consultant if your symptoms are worsening or not improving.     In order to better manage my stress, I will:    Exercise  Get some form of exercise, every day. This will help reduce pain and release endorphins, the  feel good  chemicals in your brain. This is almost as good as taking antidepressants!  This is not the same as joining a gym and then never going! (they count on that by the way ) It can be as simple as just going for a walk or doing some gardening, anything that will get you moving.      Hygiene   Maintain good hygiene (Get out of bed in the morning, Make your bed, Brush your teeth, Take a shower, and Get dressed like you were going to work, even if you are unemployed).  If your clothes don't fit try to get ones that do.    Diet  I will strive to eat foods that are good for me, drink plenty of water, and avoid excessive sugar, caffeine, alcohol, and other mood-altering substances.  Some foods that are helpful in depression are: complex carbohydrates, B vitamins, flaxseed, fish or fish oil, fresh fruits and vegetables.    Psychotherapy  I agree to participate in Individual Therapy (if recommended).    Medication  If prescribed medications, I agree to take them.   Missing doses can result in serious side effects.  I understand that drinking alcohol, or other illicit drug use, may cause potential side effects.  I will not stop my medication abruptly without first discussing it with my provider.    Staying Connected With Others  I will stay in touch with my friends, family members, and my primary care provider/team.    Use your imagination  Be creative.  We all have a creative side; it doesn t matter if it s oil painting, sand castles, or mud pies! This will also kick up the endorphins.    Witness Beauty  (AKA stop and smell the roses) Take a look outside, even in mid-winter. Notice colors, textures. Watch the squirrels and birds.     Service to others  Be of service to others.  There is always someone else in need.  By helping others we can  get out of ourselves  and remember the really important things.  This also provides opportunities for practicing all the other parts of the program.    Humor  Laugh and be silly!  Adjust your TV habits for less news and crime-drama and more comedy.    Control your stress  Try breathing deep, massage therapy, biofeedback, and meditation. Find time to relax each day.     My support system    Clinic Contact:  Phone number:    Contact 1:  Phone number:    Contact 2:  Phone number:    [place]/:  Phone number:    Therapist:  Phone number:    Local crisis center:    Phone number:    Other community support:  Phone number:

## 2018-01-09 LAB — DEPRECATED CALCIDIOL+CALCIFEROL SERPL-MC: 25 UG/L (ref 20–75)

## 2018-01-09 ASSESSMENT — ASTHMA QUESTIONNAIRES: ACT_TOTALSCORE: 20

## 2018-01-09 ASSESSMENT — ANXIETY QUESTIONNAIRES: GAD7 TOTAL SCORE: 14

## 2018-01-10 ENCOUNTER — OFFICE VISIT (OUTPATIENT)
Dept: FAMILY MEDICINE | Facility: CLINIC | Age: 23
End: 2018-01-10
Payer: COMMERCIAL

## 2018-01-10 VITALS
RESPIRATION RATE: 16 BRPM | HEART RATE: 96 BPM | WEIGHT: 144.2 LBS | DIASTOLIC BLOOD PRESSURE: 81 MMHG | SYSTOLIC BLOOD PRESSURE: 132 MMHG | TEMPERATURE: 98 F | HEIGHT: 65 IN | BODY MASS INDEX: 24.03 KG/M2 | OXYGEN SATURATION: 98 %

## 2018-01-10 DIAGNOSIS — Z30.017 INSERTION OF IMPLANTABLE SUBDERMAL CONTRACEPTIVE: ICD-10-CM

## 2018-01-10 DIAGNOSIS — Z00.00 WELL ADULT EXAM: Primary | ICD-10-CM

## 2018-01-10 DIAGNOSIS — R87.612 PAPANICOLAOU SMEAR OF CERVIX WITH LOW GRADE SQUAMOUS INTRAEPITHELIAL LESION (LGSIL): ICD-10-CM

## 2018-01-10 LAB — BETA HCG QUAL IFA URINE: NEGATIVE

## 2018-01-10 PROCEDURE — G0476 HPV COMBO ASSAY CA SCREEN: HCPCS | Performed by: FAMILY MEDICINE

## 2018-01-10 PROCEDURE — 88175 CYTOPATH C/V AUTO FLUID REDO: CPT | Performed by: FAMILY MEDICINE

## 2018-01-10 PROCEDURE — 84703 CHORIONIC GONADOTROPIN ASSAY: CPT | Performed by: FAMILY MEDICINE

## 2018-01-10 PROCEDURE — 88141 CYTOPATH C/V INTERPRET: CPT | Performed by: FAMILY MEDICINE

## 2018-01-10 PROCEDURE — 11981 INSERTION DRUG DLVR IMPLANT: CPT | Performed by: FAMILY MEDICINE

## 2018-01-10 PROCEDURE — 99395 PREV VISIT EST AGE 18-39: CPT | Mod: 25 | Performed by: FAMILY MEDICINE

## 2018-01-10 ASSESSMENT — ANXIETY QUESTIONNAIRES
1. FEELING NERVOUS, ANXIOUS, OR ON EDGE: MORE THAN HALF THE DAYS
5. BEING SO RESTLESS THAT IT IS HARD TO SIT STILL: MORE THAN HALF THE DAYS
6. BECOMING EASILY ANNOYED OR IRRITABLE: MORE THAN HALF THE DAYS
3. WORRYING TOO MUCH ABOUT DIFFERENT THINGS: MORE THAN HALF THE DAYS
7. FEELING AFRAID AS IF SOMETHING AWFUL MIGHT HAPPEN: NOT AT ALL
2. NOT BEING ABLE TO STOP OR CONTROL WORRYING: MORE THAN HALF THE DAYS
GAD7 TOTAL SCORE: 12
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
## 2018-01-10 NOTE — MR AVS SNAPSHOT
After Visit Summary   1/10/2018    Amy Jauregui    MRN: [id number]           Patient Information     Date Of Birth          1995        Visit Information        Provider Department      1/10/2018 12:40 PM Cirilo Delong MD [facility]        Today's Diagnoses     LSIL on pap (<25 yrs of age)    -  1    Insertion of Nexplanon due for removal 1/10/2021          Care Instructions      Preventive Health Recommendations  Female Ages 18 to 25     Yearly exam:     See your health care provider every year in order to  o Review health changes.   o Discuss preventive care.    o Review your medicines if your doctor has prescribed any.      You should be tested each year for STDs (sexually transmitted diseases).       After age 20, talk to your provider about how often you should have cholesterol testing.      Starting at age 21, get a Pap test every three years. If you have an abnormal result, your doctor may have you test more often.      If you are at risk for diabetes, you should have a diabetes test (fasting glucose).     Shots:     Get a flu shot each year.     Get a tetanus shot every 10 years.     Consider getting the shot (vaccine) that prevents cervical cancer (Gardasil).    Nutrition:     Eat at least 5 servings of fruits and vegetables each day.    Eat whole-grain bread, whole-wheat pasta and brown rice instead of white grains and rice.    Talk to your provider about Calcium and Vitamin D.     Lifestyle    Exercise at least 150 minutes a week each week (30 minutes a day, 5 days a week). This will help you control your weight and prevent disease.    Limit alcohol to one drink per day.    No smoking.     Wear sunscreen to prevent skin cancer.    See your dentist every six months for an exam and cleaning.          Follow-ups after your visit        Who to contact     If you have questions or need follow up information about today's clinic visit or your schedule  "please contact [facility] directly at [phone].  Normal or non-critical lab and imaging results will be communicated to you by MyChart, letter or phone within 4 business days after the clinic has received the results. If you do not hear from us within 7 days, please contact the clinic through tabulatehart or phone. If you have a critical or abnormal lab result, we will notify you by phone as soon as possible.  Submit refill requests through [employer] or call your pharmacy and they will forward the refill request to us. Please allow 3 business days for your refill to be completed.          Additional Information About Your Visit        tabulateharThe Paper Store Information     [employer] gives you secure access to your electronic health record. If you see a primary care provider, you can also send messages to your care team and make appointments. If you have questions, please call your primary care clinic.  If you do not have a primary care provider, please call [phone] and they will assist you.        Care EveryWhere ID     This is your Care EveryWhere ID. This could be used by other organizations to access your [place] medical records  [id number]        Your Vitals Were     Pulse Temperature Respirations Height Last Period Pulse Oximetry    96 98  F (36.7  C) (Tympanic) 16 5' 4.5\" (1.638 m) 01/04/2018 98%    Breastfeeding? BMI (Body Mass Index)                No 24.37 kg/m2           Blood Pressure from Last 3 Encounters:   01/10/18 132/81   01/08/18 132/88   09/25/17 132/78    Weight from Last 3 Encounters:   01/10/18 144 lb 3.2 oz (65.4 kg)   01/08/18 143 lb 1.6 oz (64.9 kg)   09/25/17 146 lb 3.2 oz (66.3 kg)              We Performed the Following     Beta HCG qual IFA urine     ETONOGESTREL IMPLANT SYSTEM     INSERTION NON-BIODEGRADABLE DRUG DELIVERY IMPLANT     Pap imaged thin layer diagnostic reflex to HPV if ASCUS          Today's Medication Changes          These changes are accurate as of: 1/10/18  4:18 " PM.  If you have any questions, ask your nurse or doctor.               Start taking these medicines.        Dose/Directions    etonogestrel 68 MG Impl   Commonly known as:  IMPLANON/NEXPLANON   Used for:  Insertion of implantable subdermal contraceptive   Started by:  Cirilo Delong MD        Dose:  1 each   1 each (68 mg) by Subdermal route once for 1 dose   Quantity:  1 each   Refills:  0         Stop taking these medicines if you haven't already. Please contact your care team if you have questions.     norgestim-eth estrad triphasic 0.18/0.215/0.25 MG-35 MCG per tablet   Commonly known as:  TRINESSA (28)   Stopped by:  Cirilo Delong MD                Where to get your medicines      Some of these will need a paper prescription and others can be bought over the counter.  Ask your nurse if you have questions.     You don't need a prescription for these medications     etonogestrel 68 MG Impl                Primary Care Provider Office Phone # Fax #    R Blair Reynolds [phone] [address] [facility] [address]        Equal Access to Services     [facility]: Hadii aad ku hadasho Soomaali, waaxda luqadaha, qaybta kaalmada adeegyavalente, joe shi . So [facility] [phone].    ATENCIÓN: Si habla español, tiene a souza disposición servicios gratuitos de asistencia lingüística. Ll[facility] [phone].    We comply with applicable federal civil rights laws and [place] laws. We do not discriminate on the basis of race, color, national origin, age, disability, sex, sexual orientation, or gender identity.            Thank you!     Thank you for choosing [facility]  for your care. Our goal is always to provide you with excellent care. Hearing back from our patients is one way we can continue to improve our services. Please take a few minutes to complete the written survey that you may receive in the mail after your visit with  us. Thank you!             Your Updated Medication List - Protect others around you: Learn how to safely use, store and throw away your medicines at www.disposemymeds.org.          This list is accurate as of: 1/10/18  4:18 PM.  Always use your most recent med list.                   Brand Name Dispense Instructions for use Diagnosis    albuterol 108 (90 BASE) MCG/ACT Inhaler    PROAIR HFA/PROVENTIL HFA/VENTOLIN HFA    1 Inhaler    Inhale 2 puffs into the lungs every 4 hours as needed for shortness of breath / dyspnea    Exercise-induced asthma       etonogestrel 68 MG Impl    IMPLANON/NEXPLANON    1 each    1 each (68 mg) by Subdermal route once for 1 dose    Insertion of implantable subdermal contraceptive       omeprazole 20 MG CR capsule    priLOSEC    14 capsule    Take 1 capsule (20 mg) by mouth daily    Gastroesophageal reflux disease, esophagitis presence not specified       sertraline 50 MG tablet    ZOLOFT    30 tablet    Take 1/2 tablet (25 mg) for 1-2 weeks, then increase to 1 tablet orally daily    Major depressive disorder, recurrent episode, moderate (H), GURU (generalized anxiety disorder)       traZODone 50 MG tablet    DESYREL    30 tablet    Take 0.5-1 tablets (25-50 mg) by mouth nightly as needed for sleep    Insomnia, unspecified type

## 2018-01-11 ASSESSMENT — ANXIETY QUESTIONNAIRES: GAD7 TOTAL SCORE: 12

## 2018-01-11 ASSESSMENT — ASTHMA QUESTIONNAIRES: ACT_TOTALSCORE: 20

## 2018-01-14 PROBLEM — Z30.017 INSERTION OF IMPLANTABLE SUBDERMAL CONTRACEPTIVE: Status: ACTIVE | Noted: 2018-01-14

## 2018-01-16 LAB
COPATH REPORT: ABNORMAL
PAP: ABNORMAL

## 2018-01-17 LAB
FINAL DIAGNOSIS: ABNORMAL
HPV HR 12 DNA CVX QL NAA+PROBE: POSITIVE
HPV16 DNA SPEC QL NAA+PROBE: NEGATIVE
HPV18 DNA SPEC QL NAA+PROBE: NEGATIVE
SPECIMEN DESCRIPTION: ABNORMAL
SPECIMEN SOURCE CVX/VAG CYTO: ABNORMAL

## 2018-01-30 DIAGNOSIS — N94.6 DYSMENORRHEA: ICD-10-CM

## 2018-02-02 RX ORDER — NORGESTIMATE AND ETHINYL ESTRADIOL 7DAYSX3 28
KIT ORAL
Qty: 28 TABLET | Refills: 0 | OUTPATIENT
Start: 2018-02-02

## 2018-02-02 NOTE — TELEPHONE ENCOUNTER
etonogestrel (IMPLANON/NEXPLANON) 68 MG IMPL 1 each 0 1/10/2018     Placed 1/10/18 by provider    Niya CLAIRE Rn

## 2018-03-01 ENCOUNTER — MYC MEDICAL ADVICE (OUTPATIENT)
Dept: FAMILY MEDICINE | Facility: CLINIC | Age: 23
End: 2018-03-01

## 2018-03-01 ENCOUNTER — MYC REFILL (OUTPATIENT)
Dept: FAMILY MEDICINE | Facility: CLINIC | Age: 23
End: 2018-03-01

## 2018-03-01 DIAGNOSIS — F33.1 MAJOR DEPRESSIVE DISORDER, RECURRENT EPISODE, MODERATE (H): ICD-10-CM

## 2018-03-01 DIAGNOSIS — F41.1 GAD (GENERALIZED ANXIETY DISORDER): ICD-10-CM

## 2018-03-01 NOTE — TELEPHONE ENCOUNTER
Medication is being filled for 1 time refill only due to:  Patient needs to be seen because she is due for follow up with provider, patient sent QuickBlox message stating she will call today to make an appt.     ARMEN BoudreauxN RN  [facility]

## 2018-03-01 NOTE — TELEPHONE ENCOUNTER
Message from MyChart:  Original authorizing provider: KRISTEL Jacobs would like a refill of the following medications:  sertraline (ZOLOFT) 50 MG tablet [Marie Hearn PA-C]    Preferred pharmacy: [facility] - [place], [place] - [address] [place] [address]    Comment:

## 2018-03-06 NOTE — PROGRESS NOTES
SUBJECTIVE:   Amy Jauregui is a 22 year old female who presents to clinic today for the following health issues:    Anxiety Follow-Up    Status since last visit: Improved doesn't see a huge difference but definitely progress    Other associated symptoms:None    Complicating factors:   Significant life event: No   Current substance abuse: None  Depression symptoms: Yes  GURU-7 SCORE 1/8/2018 1/10/2018 3/8/2018   Total Score - - -   Total Score 14 12 5       GURU-7    Amount of exercise or physical activity: None    Problems taking medications regularly: No    Medication side effects: loss of appetite sometimes, otherwise none    Diet: regular (no restrictions)    Says her sleeping is better than before, hasn't been taking trazodone because it makes her groggy  Keeping a dream journal  Sleeps better during the day    Has been taking zoloft regularly  Seeing some progress but not dramatic. About 80% better. Still some depression symptoms  Next week is spring break so she will be working out more  Has an interview for a new job.   Still interested in therapy    Stomach is much better and now off acid blocker    Had nexplanon placed in January, doing well on this.     Started vitamin D3        Problem list and histories reviewed & adjusted, as indicated.  Additional history: as documented    ROS:  CONSTITUTIONAL: NEGATIVE for fever, chills, change in weight  ENT/MOUTH: NEGATIVE for ear, mouth and throat problems  RESP: NEGATIVE for significant cough or SOB  CV: NEGATIVE for chest pain, palpitations or peripheral edema  ENDOCRINE: NEGATIVE for temperature intolerance, skin/hair changes  PSYCHIATRIC: NEGATIVE forthoughts of hurting someone else and thoughts of self harm  Negative for numbness or tingling or dizziness    Patient Active Problem List   Diagnosis     Exercise-induced asthma     Intermittent asthma     Congenital anomaly of urinary system     Moderate major depression (H)     Pyelonephritis     Duplicated  urinary collecting system     Anemia     Frequent UTI     Dysmenorrhea     Irregular menses     GURU (generalized anxiety disorder)     Upper respiratory tract infection, unspecified upper respiratory infection     ASCUS with positive high risk HPV cervical     Insertion of Nexplanon due for removal 1/10/2021     Past Surgical History:   Procedure Laterality Date     ABDOMEN SURGERY       CYSTOSCOPY  7/10    and vaginoscopy= normal     SURGICAL HISTORY OF -   08/15/02    Right extravesical ureteral reimplant right ectopic upper pole refluxing ureter       Social History   Substance Use Topics     Smoking status: Never Smoker     Smokeless tobacco: Never Used     Alcohol use Yes      Comment: occ     Family History   Problem Relation Age of Onset     Cardiovascular Mother      Unknown/Adopted Mother      Hypertension Maternal Grandmother      Unknown/Adopted Maternal Grandmother      DIABETES Maternal Grandfather      Unknown/Adopted Maternal Grandfather      Unknown/Adopted Father      Unknown/Adopted Brother      Unknown/Adopted Sister      Unknown/Adopted Paternal Grandmother      Unknown/Adopted Paternal Grandfather            Problem list, Medication list, Allergies, and Medical/Social/Surgical histories reviewed in [facility] and updated as appropriate.    OBJECTIVE:                                                    /76  Pulse 88  Temp 97.4  F (36.3  C) (Oral)  Wt 142 lb 3.2 oz (64.5 kg)  SpO2 97%  BMI 24.03 kg/m2 Body mass index is 24.03 kg/(m^2).   GENERAL:  Alert and oriented x 3.  WD WN  HEENT: Atramautic, PERRLA.  EOMs intact, Normal canal, TM normal, nostrils patent, oropharynx hydrated without edema erythema or exudates. Good dentition.  HEART:  Regular rhythm.  Normal rate.  No murmur, gallop, rub or  ectopy.  PMI is not displaced.  LUNGS:  Clear to auscultation bilaterally without rhonchi rhales or wheezes. Chest rise symmetrical and no tenderness to palpation. Good breath sounds throughout. NO  "deformity and no accessory muscle use  SKIN:  Warm and dry.   PSYCH: Mood: \"pretty good\"   Affect: bright   Insight: good   Thought process: linear      No results found for this or any previous visit (from the past 24 hour(s)).       ASSESSMENT/PLAN:                                                        ICD-10-CM    1. Major depressive disorder, recurrent episode, moderate (H) F33.1 sertraline (ZOLOFT) 50 MG tablet   2. GURU (generalized anxiety disorder) F41.1 sertraline (ZOLOFT) 50 MG tablet       PLAN:  1) Health habits reviewed, and patient encouraged to avoid alcohol and exercise regularly.  2) Medications and duration of treatment discussed, possible side effects reviewed, and increase zoloft.  3) Return appointment in 5 weeks.  4) Referral to therapy    Patient Instructions   Increase zoloft to 75 mg daily  Continue vitamin D  Add exercise daily  Melatonin 0.5 mg nightly  Use trazodone if needed  Follow up in 5 weeks  Return to clinic for any new or worsening symptoms or go to ER Urgent care in off hours          Estimated body mass index is 24.03 kg/(m^2) as calculated from the following:    Height as of 1/10/18: 5' 4.5\" (1.638 m).    Weight as of this encounter: 142 lb 3.2 oz (64.5 kg).       Marie Lion Roosevelt [facility] Hospitaldarleen  [facility]      "

## 2018-03-08 ENCOUNTER — OFFICE VISIT (OUTPATIENT)
Dept: FAMILY MEDICINE | Facility: CLINIC | Age: 23
End: 2018-03-08
Payer: COMMERCIAL

## 2018-03-08 VITALS
BODY MASS INDEX: 24.03 KG/M2 | TEMPERATURE: 97.4 F | SYSTOLIC BLOOD PRESSURE: 110 MMHG | DIASTOLIC BLOOD PRESSURE: 76 MMHG | HEART RATE: 88 BPM | OXYGEN SATURATION: 97 % | WEIGHT: 142.2 LBS

## 2018-03-08 DIAGNOSIS — F33.1 MAJOR DEPRESSIVE DISORDER, RECURRENT EPISODE, MODERATE (H): Primary | ICD-10-CM

## 2018-03-08 DIAGNOSIS — F41.1 GAD (GENERALIZED ANXIETY DISORDER): ICD-10-CM

## 2018-03-08 PROCEDURE — 99214 OFFICE O/P EST MOD 30 MIN: CPT | Performed by: PHYSICIAN ASSISTANT

## 2018-03-08 ASSESSMENT — ANXIETY QUESTIONNAIRES
2. NOT BEING ABLE TO STOP OR CONTROL WORRYING: SEVERAL DAYS
3. WORRYING TOO MUCH ABOUT DIFFERENT THINGS: SEVERAL DAYS
6. BECOMING EASILY ANNOYED OR IRRITABLE: SEVERAL DAYS
5. BEING SO RESTLESS THAT IT IS HARD TO SIT STILL: NOT AT ALL
1. FEELING NERVOUS, ANXIOUS, OR ON EDGE: SEVERAL DAYS
7. FEELING AFRAID AS IF SOMETHING AWFUL MIGHT HAPPEN: NOT AT ALL
GAD7 TOTAL SCORE: 5

## 2018-03-08 ASSESSMENT — PATIENT HEALTH QUESTIONNAIRE - PHQ9: 5. POOR APPETITE OR OVEREATING: SEVERAL DAYS

## 2018-03-08 NOTE — PATIENT INSTRUCTIONS
Increase zoloft to 75 mg daily  Continue vitamin D  Add exercise daily  Melatonin 0.5 mg nightly  Use trazodone if needed  Follow up in 5 weeks  Return to clinic for any new or worsening symptoms or go to ER Urgent care in off hours

## 2018-03-08 NOTE — MR AVS SNAPSHOT
After Visit Summary   3/8/2018    Amy Jauregui    MRN: [id number]           Patient Information     Date Of Birth          1995        Visit Information        Provider Department      3/8/2018 9:40 AM Marie Hearn PA-C [facility]        Today's Diagnoses     Screening examination for venereal disease    -  1    Major depressive disorder, recurrent episode, moderate (H)        GURU (generalized anxiety disorder)          Care Instructions    Increase zoloft to 75 mg daily  Continue vitamin D  Add exercise daily  Melatonin 0.5 mg nightly  Use trazodone if needed  Follow up in 5 weeks  Return to clinic for any new or worsening symptoms or go to ER Urgent care in off hours            Follow-ups after your visit        Who to contact     If you have questions or need follow up information about today's clinic visit or your schedule please contact [facility] directly at [phone].  Normal or non-critical lab and imaging results will be communicated to you by MyChart, letter or phone within 4 business days after the clinic has received the results. If you do not hear from us within 7 days, please contact the clinic through shenzhoufuhart or phone. If you have a critical or abnormal lab result, we will notify you by phone as soon as possible.  Submit refill requests through Building[employer] or call your pharmacy and they will forward the refill request to us. Please allow 3 business days for your refill to be completed.          Additional Information About Your Visit        MyChart Information     BuildingIQ gives you secure access to your electronic health record. If you see a primary care provider, you can also send messages to your care team and make appointments. If you have questions, please call your primary care clinic.  If you do not have a primary care provider, please call [phone] and they will assist you.        Care EveryWhere ID     This is your Care  EveryWhere ID. This could be used by other organizations to access your [place] medical records  [id number]        Your Vitals Were     Pulse Temperature Pulse Oximetry BMI (Body Mass Index)          88 97.4  F (36.3  C) (Oral) 97% 24.03 kg/m2         Blood Pressure from Last 3 Encounters:   03/08/18 110/76   01/10/18 132/81   01/08/18 132/88    Weight from Last 3 Encounters:   03/08/18 142 lb 3.2 oz (64.5 kg)   01/10/18 144 lb 3.2 oz (65.4 kg)   01/08/18 143 lb 1.6 oz (64.9 kg)              Today, you had the following     No orders found for display         Today's Medication Changes          These changes are accurate as of 3/8/18 10:05 AM.  If you have any questions, ask your nurse or doctor.               These medicines have changed or have updated prescriptions.        Dose/Directions    sertraline 50 MG tablet   Commonly known as:  ZOLOFT   This may have changed:    - how much to take  - how to take this  - when to take this  - additional instructions   Used for:  Major depressive disorder, recurrent episode, moderate (H), GURU (generalized anxiety disorder)   Changed by:  Marie Hearn PA-C        Dose:  75 mg   Take 1.5 tablets (75 mg) by mouth daily   Quantity:  60 tablet   Refills:  0            Where to get your medicines      These medications were sent to [facility] - [address] [address]     Phone:  [phone]     sertraline 50 MG tablet                Primary Care Provider Office Phone # Fax #    R Blair Reynolds [phone] [address] [facility] [address]        Equal Access to Services     NESSA HODGE AH: Hadrusty Hawkins, wapinkyda luelainaadaha, qaybta kaalmada joe mclean. So [facility] [phone].    ATENCIÓN: Si habla español, tiene a souza disposición servicios gratuitos de asistencia lingüística. Llame al [phone].    We comply  with applicable federal civil rights laws and [place] laws. We do not discriminate on the basis of race, color, national origin, age, disability, sex, sexual orientation, or gender identity.            Thank you!     Thank you for choosing [facility]  for your care. Our goal is always to provide you with excellent care. Hearing back from our patients is one way we can continue to improve our services. Please take a few minutes to complete the written survey that you may receive in the mail after your visit with us. Thank you!             Your Updated Medication List - Protect others around you: Learn how to safely use, store and throw away your medicines at www.disposemymeds.org.          This list is accurate as of 3/8/18 10:05 AM.  Always use your most recent med list.                   Brand Name Dispense Instructions for use Diagnosis    albuterol 108 (90 BASE) MCG/ACT Inhaler    PROAIR HFA/PROVENTIL HFA/VENTOLIN HFA    1 Inhaler    Inhale 2 puffs into the lungs every 4 hours as needed for shortness of breath / dyspnea    Exercise-induced asthma       etonogestrel 68 MG Impl    IMPLANON/NEXPLANON    1 each    1 each (68 mg) by Subdermal route once for 1 dose    Insertion of implantable subdermal contraceptive       omeprazole 20 MG CR capsule    priLOSEC    14 capsule    Take 1 capsule (20 mg) by mouth daily    Gastroesophageal reflux disease, esophagitis presence not specified       sertraline 50 MG tablet    ZOLOFT    60 tablet    Take 1.5 tablets (75 mg) by mouth daily    Major depressive disorder, recurrent episode, moderate (H), GURU (generalized anxiety disorder)       traZODone 50 MG tablet    DESYREL    30 tablet    Take 0.5-1 tablets (25-50 mg) by mouth nightly as needed for sleep    Insomnia, unspecified type

## 2018-03-09 ASSESSMENT — ANXIETY QUESTIONNAIRES: GAD7 TOTAL SCORE: 5

## 2018-03-09 ASSESSMENT — PATIENT HEALTH QUESTIONNAIRE - PHQ9: SUM OF ALL RESPONSES TO PHQ QUESTIONS 1-9: 8

## 2018-04-24 ENCOUNTER — MYC REFILL (OUTPATIENT)
Dept: FAMILY MEDICINE | Facility: CLINIC | Age: 23
End: 2018-04-24

## 2018-04-24 DIAGNOSIS — F41.1 GAD (GENERALIZED ANXIETY DISORDER): ICD-10-CM

## 2018-04-24 DIAGNOSIS — F33.1 MAJOR DEPRESSIVE DISORDER, RECURRENT EPISODE, MODERATE (H): ICD-10-CM

## 2018-04-24 NOTE — TELEPHONE ENCOUNTER
Message from MyChart:  Original authorizing provider: KRISTEL Jacobs would like a refill of the following medications:  sertraline (ZOLOFT) 50 MG tablet [Marie Hearn PA-C]    Preferred pharmacy: [facility] [place], [place] - [address] [place] [address]    Comment:

## 2018-04-24 NOTE — TELEPHONE ENCOUNTER
Called patient. Per Dhiraj last office note on 03/08/18 patient to follow up in 5 weeks. Scheduled appt for 04/26/18.    Per patient can wait till appt to get medication refilled.    Kathi Strauss RN  [facility]

## 2018-04-26 ENCOUNTER — OFFICE VISIT (OUTPATIENT)
Dept: FAMILY MEDICINE | Facility: CLINIC | Age: 23
End: 2018-04-26
Payer: COMMERCIAL

## 2018-04-26 VITALS
HEART RATE: 94 BPM | DIASTOLIC BLOOD PRESSURE: 86 MMHG | BODY MASS INDEX: 24.83 KG/M2 | TEMPERATURE: 97.6 F | OXYGEN SATURATION: 97 % | WEIGHT: 146.9 LBS | RESPIRATION RATE: 14 BRPM | SYSTOLIC BLOOD PRESSURE: 108 MMHG

## 2018-04-26 DIAGNOSIS — F41.1 GAD (GENERALIZED ANXIETY DISORDER): ICD-10-CM

## 2018-04-26 DIAGNOSIS — F33.1 MAJOR DEPRESSIVE DISORDER, RECURRENT EPISODE, MODERATE (H): Primary | ICD-10-CM

## 2018-04-26 PROCEDURE — 99214 OFFICE O/P EST MOD 30 MIN: CPT | Performed by: PHYSICIAN ASSISTANT

## 2018-04-26 NOTE — PROGRESS NOTES
SUBJECTIVE:   Amy Jauregui is a 22 year old female who presents to clinic today for the following health issues:      Depression and Anxiety Follow-UpWorsened Status since last visit:     Other associated symptoms:Not sleeping loss of appetite, bad nightmares going on for a while, face breaking out.     Complicating factors:     Significant life event: Yes-  Will be graduating May 13th     Current substance abuse: None    PHQ-9 1/8/2018 1/10/2018 3/8/2018   Total Score 9 9 8   Q9: Suicide Ideation Not at all Not at all Not at all     GURU-7 SCORE 1/8/2018 1/10/2018 3/8/2018   Total Score - - -   Total Score 14 12 5       PHQ-9  English  PHQ-9   Any Language  GURU-7  Suicide Assessment Five-step Evaluation and Treatment (SAFE-T)    Amount of exercise or physical activity: None    Problems taking medications regularly: No    Medication side effects: none    Diet: regular (no restrictions)      Not sleeping well, waking up every hour or so  Having more nightmares.usually being chased by something, death, admits she likes horror movies but doesn't watch them routinely.   Tries to take melatonin instead of trazodone.     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         Problem list and histories reviewed & adjusted, as indicated.  Additional history: as documented    ROS:  C: NEGATIVE for fever, chills, change in weight  ENDOCRINE: NEGATIVE for temperature intolerance, skin/hair changes  PSYCHIATRIC: NEGATIVE forthoughts of hurting someone else and thoughts of self harm    Patient Active Problem List   Diagnosis     Exercise-induced asthma     Intermittent asthma     Congenital anomaly of urinary system     Moderate major depression (H)     Pyelonephritis     Duplicated urinary collecting system     Anemia     Frequent UTI     Dysmenorrhea     Irregular menses     GURU (generalized anxiety disorder)     Upper respiratory tract  "infection, unspecified upper respiratory infection     ASCUS with positive high risk HPV cervical     Insertion of Nexplanon due for removal 1/10/2021     Past Surgical History:   Procedure Laterality Date     ABDOMEN SURGERY       CYSTOSCOPY  7/10    and vaginoscopy= normal     SURGICAL HISTORY OF -   08/15/02    Right extravesical ureteral reimplant right ectopic upper pole refluxing ureter       Social History   Substance Use Topics     Smoking status: Never Smoker     Smokeless tobacco: Never Used     Alcohol use Yes      Comment: occ     Family History   Problem Relation Age of Onset     Cardiovascular Mother      Unknown/Adopted Mother      Hypertension Maternal Grandmother      Unknown/Adopted Maternal Grandmother      DIABETES Maternal Grandfather      Unknown/Adopted Maternal Grandfather      Unknown/Adopted Father      Unknown/Adopted Brother      Unknown/Adopted Sister      Unknown/Adopted Paternal Grandmother      Unknown/Adopted Paternal Grandfather            Problem list, Medication list, Allergies, and Medical/Social/Surgical histories reviewed in [facility] and updated as appropriate.    OBJECTIVE:                                                    /86  Pulse 94  Temp 97.6  F (36.4  C) (Oral)  Resp 14  Wt 146 lb 14.4 oz (66.6 kg)  SpO2 97%  BMI 24.83 kg/m2 Body mass index is 24.83 kg/(m^2).   GENERAL:  Alert and oriented x 3.  WD WN    PSYCH: Mood: \"ok\"   Affect: anxious   Insight: good   Thought process: linear      No results found for this or any previous visit (from the past 24 hour(s)).       ASSESSMENT/PLAN:                                                        ICD-10-CM    1. Major depressive disorder, recurrent episode, moderate (H) F33.1 sertraline (ZOLOFT) 50 MG tablet   2. GURU (generalized anxiety disorder) F41.1 sertraline (ZOLOFT) 50 MG tablet       PLAN:  1) Health habits reviewed, and patient encouraged to avoid alcohol and exercise regularly.  2) Medications and duration of " "treatment discussed, possible side effects reviewed, and increase zoloft.  3) Return appointment in 4-6 weeks.  4) Referral to therapy  Total time 25 minutes, greater than 50% counseling which is discussed in plan above and in patient instructions.   Patient Instructions   Increase zoloft to 125 mg daily  Recheck in 4-6 weeks  No caffeine past 10 AM  Try unisom 1/4 to 1/2 pill as needed  Exercise 20 min daily  Return to clinic for any new or worsening symptoms or go to ER Urgent care in off hours          Estimated body mass index is 24.83 kg/(m^2) as calculated from the following:    Height as of 1/10/18: 5' 4.5\" (1.638 m).    Weight as of this encounter: 146 lb 14.4 oz (66.6 kg).       Marie Lion [facility]  [facility]      "

## 2018-04-26 NOTE — PATIENT INSTRUCTIONS
Increase zoloft to 125 mg daily  Recheck in 4-6 weeks  No caffeine past 10 AM  Try unisom 1/4 to 1/2 pill as needed  Exercise 20 min daily  Return to clinic for any new or worsening symptoms or go to ER Urgent care in off hours

## 2018-04-26 NOTE — MR AVS SNAPSHOT
After Visit Summary   4/26/2018    Amy Jauregui    MRN: [id number]           Patient Information     Date Of Birth          1995        Visit Information        Provider Department      4/26/2018 8:00 AM Marie Hearn PA-C [facility]        Today's Diagnoses     Screening examination for venereal disease    -  1    Major depressive disorder, recurrent episode, moderate (H)        GURU (generalized anxiety disorder)          Care Instructions    Increase zoloft to 125 mg daily  Recheck in 4-6 weeks  No caffeine past 10 AM  Try unisom 1/4 to 1/2 pill as needed  Exercise 20 min daily  Return to clinic for any new or worsening symptoms or go to ER Urgent care in off hours            Follow-ups after your visit        Who to contact     If you have questions or need follow up information about today's clinic visit or your schedule please contact [facility] directly at [phone].  Normal or non-critical lab and imaging results will be communicated to you by MyChart, letter or phone within 4 business days after the clinic has received the results. If you do not hear from us within 7 days, please contact the clinic through Zenphhart or phone. If you have a critical or abnormal lab result, we will notify you by phone as soon as possible.  Submit refill requests through [employer] or call your pharmacy and they will forward the refill request to us. Please allow 3 business days for your refill to be completed.          Additional Information About Your Visit        MyChart Information     [employer] gives you secure access to your electronic health record. If you see a primary care provider, you can also send messages to your care team and make appointments. If you have questions, please call your primary care clinic.  If you do not have a primary care provider, please call [phone] and they will assist you.        Care EveryWhere ID     This is your Care  EveryWhere ID. This could be used by other organizations to access your [place] medical records  [id number]        Your Vitals Were     Pulse Temperature Respirations Pulse Oximetry BMI (Body Mass Index)       94 97.6  F (36.4  C) (Oral) 14 97% 24.83 kg/m2        Blood Pressure from Last 3 Encounters:   04/26/18 108/86   03/08/18 110/76   01/10/18 132/81    Weight from Last 3 Encounters:   04/26/18 146 lb 14.4 oz (66.6 kg)   03/08/18 142 lb 3.2 oz (64.5 kg)   01/10/18 144 lb 3.2 oz (65.4 kg)              Today, you had the following     No orders found for display         Today's Medication Changes          These changes are accurate as of 4/26/18  8:30 AM.  If you have any questions, ask your nurse or doctor.               These medicines have changed or have updated prescriptions.        Dose/Directions    sertraline 50 MG tablet   Commonly known as:  ZOLOFT   This may have changed:  how much to take   Used for:  Major depressive disorder, recurrent episode, moderate (H), GURU (generalized anxiety disorder)        Dose:  125 mg   Take 2.5 tablets (125 mg) by mouth daily   Quantity:  75 tablet   Refills:  1            Where to get your medicines      These medications were sent to [address] [address]     Phone:  [phone]     sertraline 50 MG tablet                Primary Care Provider Office Phone # Fax #    R Blair Reynolds [phone] [address] [facility] [address]        Equal Access to Services     [facility] : Hadii gopal arenas hadasho Soru, waaxda luqadaha, qaybta kaalmada ademarjan, joe idiin hayaan adeeg kharash la'aan . So [facility] [phone].    ATENCIÓN: Si habla español, tiene a souza disposición servicios gratuitos de asistencia lingüística. Llame al [phone].    We comply with applicable federal civil rights laws and [place] laws. We do not discriminate on the basis of race,  color, national origin, age, disability, sex, sexual orientation, or gender identity.            Thank you!     Thank you for choosing [facility]  for your care. Our goal is always to provide you with excellent care. Hearing back from our patients is one way we can continue to improve our services. Please take a few minutes to complete the written survey that you may receive in the mail after your visit with us. Thank you!             Your Updated Medication List - Protect others around you: Learn how to safely use, store and throw away your medicines at www.disposemymeds.org.          This list is accurate as of 4/26/18  8:30 AM.  Always use your most recent med list.                   Brand Name Dispense Instructions for use Diagnosis    albuterol 108 (90 Base) MCG/ACT Inhaler    PROAIR HFA/PROVENTIL HFA/VENTOLIN HFA    1 Inhaler    Inhale 2 puffs into the lungs every 4 hours as needed for shortness of breath / dyspnea    Exercise-induced asthma       etonogestrel 68 MG Impl    IMPLANON/NEXPLANON    1 each    1 each (68 mg) by Subdermal route once for 1 dose    Insertion of implantable subdermal contraceptive       omeprazole 20 MG CR capsule    priLOSEC    14 capsule    Take 1 capsule (20 mg) by mouth daily    Gastroesophageal reflux disease, esophagitis presence not specified       sertraline 50 MG tablet    ZOLOFT    75 tablet    Take 2.5 tablets (125 mg) by mouth daily    Major depressive disorder, recurrent episode, moderate (H), GURU (generalized anxiety disorder)       traZODone 50 MG tablet    DESYREL    30 tablet    Take 0.5-1 tablets (25-50 mg) by mouth nightly as needed for sleep    Insomnia, unspecified type

## 2018-09-26 ENCOUNTER — OFFICE VISIT (OUTPATIENT)
Dept: FAMILY MEDICINE | Facility: CLINIC | Age: 23
End: 2018-09-26
Payer: COMMERCIAL

## 2018-09-26 VITALS
BODY MASS INDEX: 25.02 KG/M2 | DIASTOLIC BLOOD PRESSURE: 85 MMHG | TEMPERATURE: 97.7 F | SYSTOLIC BLOOD PRESSURE: 128 MMHG | RESPIRATION RATE: 14 BRPM | WEIGHT: 150.2 LBS | HEIGHT: 65 IN | OXYGEN SATURATION: 98 % | HEART RATE: 85 BPM

## 2018-09-26 DIAGNOSIS — B35.4 TINEA CORPORIS: Primary | ICD-10-CM

## 2018-09-26 PROCEDURE — 99213 OFFICE O/P EST LOW 20 MIN: CPT | Performed by: NURSE PRACTITIONER

## 2018-09-26 RX ORDER — CLOTRIMAZOLE 1 %
CREAM (GRAM) TOPICAL 2 TIMES DAILY
Qty: 15 G | Refills: 1 | Status: SHIPPED | OUTPATIENT
Start: 2018-09-26 | End: 2019-02-07

## 2018-09-26 NOTE — PATIENT INSTRUCTIONS
1.  Use cream twice daily and then cover up with bandaid to keep skin protected.  If you remove the bandaid keep skin moisturizes so it doesn't break open.  2.  Follow-up in 1-2 weeks if any persistent or worsening symptoms.    Fungal Skin Infection (Tinea)  A fungal infection occurs when too much fungus grows on or in the body. Fungus normally lives on the skin in small amounts and does not cause harm. But when too much grows on the skin, it causes an infection. This is also known as tinea. Fungal skin infections are common and not usually serious.  The infection often starts as a small red area the size of a pea. The skin may turn dry and flaky. The area may itch. As the fungus grows, it spreads out in a red [place]. Because of how it looks, fungal skin infection is often called ringworm, but it is not caused by a worm. Fungal skin infections can occur on many parts of the body. They can grow on the head, chest, arms, or legs. They can occur on the buttocks. On the feet, fungal infection is known as  athlete s foot.  It causes itchy, sometimes painful sores between the toes and the bottom or sides of the feet. In the groin, the rash is called  jock itch.   People with weak immune systems can get a fungal infection more easily. This includes people with diabetes or HIV, or who are being treated for cancer. In these cases, the fungal infection can spread and cause severe illness. Fungal infections are also more common in people who are overweight.  In most cases, treatment is done with antifungal cream or ointment. If the infection is on your scalp, you may take oral medicine. In some cases, a tiny piece of the skin (biopsy) may be taken. This is so it can be tested in a lab.  Common fungal infections are treated with creams on the skin or oral medicine.  Home care  Follow all instructions when using antifungal cream or ointment on your skin. Your healthcare provider may advise using cornstarch powder to keep your  skin dry or petroleum jelly to provide a barrier.  General care:    If you were prescribed an oral medicine, read the patient information. Talk with your healthcare provider about the risks and side effects.    Let your skin dry completely after bathing. Carefully dry your feet and between your toes.    Dress in loose cotton clothing.    Don t scratch the affected area. This can delay healing and may spread the infection. It can also cause a bacterial infection.    Keep your skin clean, but don t wash the skin too much. This can irritate your skin.    Keep in mind that it may take a week before the fungus starts to go away. It can take 2 to 4 weeks to fully clear. To prevent it from coming back, use the medicine until the rash is all gone.  Follow-up care  Follow up with your healthcare provider if the rash does not get better after 10 days of treatment. Also follow up if the rash spreads to other parts of your body.  When to seek medical advice  Call your healthcare provider right away if any of these occur:    Fever of 100.4 F (38 C) or higher    Redness or swelling that gets worse    Pain that gets worse    Foul-smelling fluid leaking from the skin  Date Last Reviewed: 11/1/2016 2000-2017 [employer] [address] [place] [address], [place] [address]. All rights reserved. This information is not intended as a substitute for professional medical care. Always follow your healthcare professional's instructions.

## 2018-09-26 NOTE — MR AVS SNAPSHOT
After Visit Summary   9/26/2018    Amy Jauregui    MRN: [id number]           Patient Information     Date Of Birth          1995        Visit Information        Provider Department      9/26/2018 8:20 AM Nakita Marin NP [facility]        Today's Diagnoses     Tinea corporis    -  1      Care Instructions    1.  Use cream twice daily and then cover up with bandaid to keep skin protected.  If you remove the bandaid keep skin moisturizes so it doesn't break open.  2.  Follow-up in 1-2 weeks if any persistent or worsening symptoms.    Fungal Skin Infection (Tinea)  A fungal infection occurs when too much fungus grows on or in the body. Fungus normally lives on the skin in small amounts and does not cause harm. But when too much grows on the skin, it causes an infection. This is also known as tinea. Fungal skin infections are common and not usually serious.  The infection often starts as a small red area the size of a pea. The skin may turn dry and flaky. The area may itch. As the fungus grows, it spreads out in a red [place]. Because of how it looks, fungal skin infection is often called ringworm, but it is not caused by a worm. Fungal skin infections can occur on many parts of the body. They can grow on the head, chest, arms, or legs. They can occur on the buttocks. On the feet, fungal infection is known as  athlete s foot.  It causes itchy, sometimes painful sores between the toes and the bottom or sides of the feet. In the groin, the rash is called  jock itch.   People with weak immune systems can get a fungal infection more easily. This includes people with diabetes or HIV, or who are being treated for cancer. In these cases, the fungal infection can spread and cause severe illness. Fungal infections are also more common in people who are overweight.  In most cases, treatment is done with antifungal cream or ointment. If the infection is on your scalp, you may take  oral medicine. In some cases, a tiny piece of the skin (biopsy) may be taken. This is so it can be tested in a lab.  Common fungal infections are treated with creams on the skin or oral medicine.  Home care  Follow all instructions when using antifungal cream or ointment on your skin. Your healthcare provider may advise using cornstarch powder to keep your skin dry or petroleum jelly to provide a barrier.  General care:    If you were prescribed an oral medicine, read the patient information. Talk with your healthcare provider about the risks and side effects.    Let your skin dry completely after bathing. Carefully dry your feet and between your toes.    Dress in loose cotton clothing.    Don t scratch the affected area. This can delay healing and may spread the infection. It can also cause a bacterial infection.    Keep your skin clean, but don t wash the skin too much. This can irritate your skin.    Keep in mind that it may take a week before the fungus starts to go away. It can take 2 to 4 weeks to fully clear. To prevent it from coming back, use the medicine until the rash is all gone.  Follow-up care  Follow up with your healthcare provider if the rash does not get better after 10 days of treatment. Also follow up if the rash spreads to other parts of your body.  When to seek medical advice  Call your healthcare provider right away if any of these occur:    Fever of 100.4 F (38 C) or higher    Redness or swelling that gets worse    Pain that gets worse    Foul-smelling fluid leaking from the skin  Date Last Reviewed: 11/1/2016 2000-2017 [employer]. [address] [place] [address], [place] [address]. All rights reserved. This information is not intended as a substitute for professional medical care. Always follow your healthcare professional's instructions.                Follow-ups after your visit        Who to contact     If you have questions or need follow up information about today's clinic  "visit or your schedule please contact [facility] directly at [phone].  Normal or non-critical lab and imaging results will be communicated to you by [employer]hart, letter or phone within 4 business days after the clinic has received the results. If you do not hear from us within 7 days, please contact the clinic through Front Appt or phone. If you have a critical or abnormal lab result, we will notify you by phone as soon as possible.  Submit refill requests through [employer] or call your pharmacy and they will forward the refill request to us. Please allow 3 business days for your refill to be completed.          Additional Information About Your Visit        [employer]har[employer]Tasks Information     [employer] gives you secure access to your electronic health record. If you see a primary care provider, you can also send messages to your care team and make appointments. If you have questions, please call your primary care clinic.  If you do not have a primary care provider, please call [phone] and they will assist you.        Care EveryWhere ID     This is your Care EveryWhere ID. This could be used by other organizations to access your [facility] medical records  [id number]        Your Vitals Were     Pulse Temperature Respirations Height Pulse Oximetry BMI (Body Mass Index)    85 97.7  F (36.5  C) (Tympanic) 14 5' 4.5\" (1.638 m) 98% 25.38 kg/m2       Blood Pressure from Last 3 Encounters:   09/26/18 128/85   04/26/18 108/86   03/08/18 110/76    Weight from Last 3 Encounters:   09/26/18 150 lb 3.2 oz (68.1 kg)   04/26/18 146 lb 14.4 oz (66.6 kg)   03/08/18 142 lb 3.2 oz (64.5 kg)              Today, you had the following     No orders found for display         Today's Medication Changes          These changes are accurate as of 9/26/18  8:37 AM.  If you have any questions, ask your nurse or doctor.               Start taking these medicines.        Dose/Directions    clotrimazole 1 % cream   Commonly known as:  " LOTRIMIN   Used for:  Tinea corporis   Started by:  Nakita Marin, NP        Apply topically 2 times daily Up to 2 weeks and stop when resolves   Quantity:  15 g   Refills:  1            Where to get your medicines      These medications [place] [facility] [place] - [address]  [address] [facility] [address]     Phone:  [phone]     clotrimazole 1 % cream                Primary Care Provider Office Phone # Fax #    R Blair Reynolds [phone] [address] [facility] [address]        Equal Access to Services     ARYA HODGE : Hadii gopal arenas hadasho Soomaali, waaxda luqadaha, qaybta kaalmada adeegyada, joe shi . So [facility] [phone].    ATENCIÓN: Si habla español, tiene a souza disposición servicios gratuitos de asistencia lingüística. Llame al [phone].    We comply with applicable federal civil rights laws and [place] laws. We do not discriminate on the basis of race, color, national origin, age, disability, sex, sexual orientation, or gender identity.            Thank you!     Thank you for choosing [facility]  for your care. Our goal is always to provide you with excellent care. Hearing back from our patients is one way we can continue to improve our services. Please take a few minutes to complete the written survey that you may receive in the mail after your visit with us. Thank you!             Your Updated Medication List - Protect others around you: Learn how to safely use, store and throw away your medicines at www.disposemymeds.org.          This list is accurate as of 9/26/18  8:37 AM.  Always use your most recent med list.                   Brand Name Dispense Instructions for use Diagnosis    albuterol 108 (90 Base) MCG/ACT inhaler    PROAIR HFA/PROVENTIL HFA/VENTOLIN HFA    1 Inhaler    Inhale 2 puffs into the lungs every 4 hours as needed for  shortness of breath / dyspnea    Exercise-induced asthma       clotrimazole 1 % cream    LOTRIMIN    15 g    Apply topically 2 times daily Up to 2 weeks and stop when resolves    Tinea corporis       etonogestrel 68 MG Impl    IMPLANON/NEXPLANON    1 each    1 each (68 mg) by Subdermal route once for 1 dose    Insertion of implantable subdermal contraceptive       omeprazole 20 MG CR capsule    priLOSEC    14 capsule    Take 1 capsule (20 mg) by mouth daily    Gastroesophageal reflux disease, esophagitis presence not specified       sertraline 50 MG tablet    ZOLOFT    75 tablet    Take 2.5 tablets (125 mg) by mouth daily    Major depressive disorder, recurrent episode, moderate (H), GURU (generalized anxiety disorder)       traZODone 50 MG tablet    DESYREL    30 tablet    Take 0.5-1 tablets (25-50 mg) by mouth nightly as needed for sleep    Insomnia, unspecified type

## 2018-09-26 NOTE — NURSING NOTE
"Chief Complaint   Patient presents with     Derm Problem     red peeling/blistering rash on right index and middle finger x3 weeks. Pain at its worst is 8/10. Occasional itching. Still has full range of motion.       Initial /85 (BP Location: Right arm, Patient Position: Chair, Cuff Size: Adult Regular)  Pulse 85  Temp 97.7  F (36.5  C) (Tympanic)  Resp 14  Ht 5' 4.5\" (1.638 m)  Wt 150 lb 3.2 oz (68.1 kg)  SpO2 98%  BMI 25.38 kg/m2 Estimated body mass index is 25.38 kg/(m^2) as calculated from the following:    Height as of this encounter: 5' 4.5\" (1.638 m).    Weight as of this encounter: 150 lb 3.2 oz (68.1 kg).    Medication Reconciliation: complete  Summer Mcdaniel MA  "

## 2018-09-26 NOTE — PROGRESS NOTES
SUBJECTIVE:   Amy Jauregui is a 22 year old female who presents to clinic today for the following health issues:    Chief Complaint   Patient presents with     Derm Problem     red peeling/blistering rash on right index and middle finger x3 weeks. Pain at its worst is 8/10. Occasional itching. Still has full range of motion.     Rash  Onset: on and off for 6 months.     Description:   Location: right hand, middle finger and ring finger.   Character: round, flakey, red  Itching (Pruritis): YES    Progression of Symptoms:  same    Accompanying Signs & Symptoms:  Fever: YES  Body aches or joint pain: no   Sore throat symptoms: no   Recent cold symptoms: no     History:   Previous similar rash: no     Precipitating factors:   Exposure to similar rash: no   New exposures: None   Recent travel: no     Alleviating factors:  none    Therapies Tried and outcome: none    Patient has tried Neosporin with no relief.  Today she states that it does appear to be going away a little bit.    Problem list and histories reviewed & adjusted, as indicated.  Additional history: as documented    Patient Active Problem List   Diagnosis     Exercise-induced asthma     Intermittent asthma     Congenital anomaly of urinary system     Moderate major depression (H)     Pyelonephritis     Duplicated urinary collecting system     Anemia     Frequent UTI     Dysmenorrhea     Irregular menses     GURU (generalized anxiety disorder)     Upper respiratory tract infection, unspecified upper respiratory infection     ASCUS with positive high risk HPV cervical     Insertion of Nexplanon due for removal 1/10/2021     Past Surgical History:   Procedure Laterality Date     ABDOMEN SURGERY       CYSTOSCOPY  7/10    and vaginoscopy= normal     SURGICAL HISTORY OF -   08/15/02    Right extravesical ureteral reimplant right ectopic upper pole refluxing ureter       Social History   Substance Use Topics     Smoking status: Never Smoker     Smokeless  tobacco: Never Used     Alcohol use Yes      Comment: occ     Family History   Problem Relation Age of Onset     Cardiovascular Mother      Unknown/Adopted Mother      Hypertension Maternal Grandmother      Unknown/Adopted Maternal Grandmother      Diabetes Maternal Grandfather      Unknown/Adopted Maternal Grandfather      Unknown/Adopted Father      Unknown/Adopted Brother      Unknown/Adopted Sister      Unknown/Adopted Paternal Grandmother      Unknown/Adopted Paternal Grandfather          Current Outpatient Prescriptions   Medication Sig Dispense Refill     albuterol (PROAIR HFA/PROVENTIL HFA/VENTOLIN HFA) 108 (90 BASE) MCG/ACT Inhaler Inhale 2 puffs into the lungs every 4 hours as needed for shortness of breath / dyspnea 1 Inhaler 11     clotrimazole (LOTRIMIN) 1 % cream Apply topically 2 times daily Up to 2 weeks and stop when resolves 15 g 1     etonogestrel (IMPLANON/NEXPLANON) 68 MG IMPL 1 each (68 mg) by Subdermal route once for 1 dose 1 each 0     omeprazole (PRILOSEC) 20 MG CR capsule Take 1 capsule (20 mg) by mouth daily 14 capsule 0     sertraline (ZOLOFT) 50 MG tablet Take 2.5 tablets (125 mg) by mouth daily (Patient not taking: Reported on 9/26/2018) 75 tablet 1     traZODone (DESYREL) 50 MG tablet Take 0.5-1 tablets (25-50 mg) by mouth nightly as needed for sleep (Patient not taking: Reported on 9/26/2018) 30 tablet 0     Allergies   Allergen Reactions     Amoxicillin Rash     Sulfa Drugs Rash       Reviewed and updated as needed this visit by clinical staff  Tobacco  Allergies  Meds  Med Hx  Surg Hx  Fam Hx  Soc Hx      Reviewed and updated as needed this visit by Provider         ROS:  CONSTITUTIONAL: NEGATIVE for fever, chills, change in weight  INTEGUMENTARY/SKIN: POSITIVE for rash generalized and hands right  RESP: NEGATIVE for significant cough or SOB  CV: NEGATIVE for chest pain, palpitations or peripheral edema  PSYCHIATRIC: NEGATIVE for changes in mood or affect  ROS otherwise  "negative    OBJECTIVE:     /85 (BP Location: Right arm, Patient Position: Chair, Cuff Size: Adult Regular)  Pulse 85  Temp 97.7  F (36.5  C) (Tympanic)  Resp 14  Ht 5' 4.5\" (1.638 m)  Wt 150 lb 3.2 oz (68.1 kg)  SpO2 98%  BMI 25.38 kg/m2  Body mass index is 25.38 kg/(m^2).  GENERAL: healthy, alert and no distress  RESP: lungs clear to auscultation - no rales, rhonchi or wheezes  CV: regular rate and rhythm, normal S1 S2, no S3 or S4, no murmur, click or rub, no peripheral edema and peripheral pulses strong  SKIN: Patient has lesions that are red with scabbing on the center and circular and raised on the 3rd right hand digit on the lateral aspects bilaterally proximal anterior finger.  She also has redness and peeling of the skin around the nails of the 2nd, 3rd and 5th finger.  They are mildly painful to touch but do not exhibit any warmth or drainage.    Diagnostic Test Results:  none     ASSESSMENT/PLAN:     1. Tinea corporis  Rash is fungal on the hand. Recommended Lotrimin cream twice daily for up to 2 weeks.  Since she types daily she can wrap a bandaid around the finger during the day.  Recommend also moisturizers during the day to keep this from cracking open.  Follow-up in 1-2 weeks if any persistent symptoms.  - clotrimazole (LOTRIMIN) 1 % cream; Apply topically 2 times daily Up to 2 weeks and stop when resolves  Dispense: 15 g; Refill: 1    See Patient Instructions    Nakita Marin NP  [facility]    "

## 2019-02-07 ENCOUNTER — OFFICE VISIT (OUTPATIENT)
Dept: FAMILY MEDICINE | Facility: CLINIC | Age: 24
End: 2019-02-07
Payer: COMMERCIAL

## 2019-02-07 VITALS
OXYGEN SATURATION: 97 % | DIASTOLIC BLOOD PRESSURE: 75 MMHG | WEIGHT: 154 LBS | TEMPERATURE: 98.1 F | HEIGHT: 65 IN | RESPIRATION RATE: 16 BRPM | BODY MASS INDEX: 25.66 KG/M2 | SYSTOLIC BLOOD PRESSURE: 111 MMHG

## 2019-02-07 DIAGNOSIS — Z00.00 WELL ADULT EXAM: ICD-10-CM

## 2019-02-07 DIAGNOSIS — R87.610 ASCUS WITH POSITIVE HIGH RISK HPV CERVICAL: ICD-10-CM

## 2019-02-07 DIAGNOSIS — Z11.3 SCREENING FOR STD (SEXUALLY TRANSMITTED DISEASE): Primary | ICD-10-CM

## 2019-02-07 DIAGNOSIS — F33.1 MAJOR DEPRESSIVE DISORDER, RECURRENT EPISODE, MODERATE (H): ICD-10-CM

## 2019-02-07 DIAGNOSIS — J45.990 EXERCISE-INDUCED ASTHMA: ICD-10-CM

## 2019-02-07 DIAGNOSIS — R87.810 ASCUS WITH POSITIVE HIGH RISK HPV CERVICAL: ICD-10-CM

## 2019-02-07 PROCEDURE — 87591 N.GONORRHOEAE DNA AMP PROB: CPT | Performed by: FAMILY MEDICINE

## 2019-02-07 PROCEDURE — 88175 CYTOPATH C/V AUTO FLUID REDO: CPT | Performed by: FAMILY MEDICINE

## 2019-02-07 PROCEDURE — 99395 PREV VISIT EST AGE 18-39: CPT | Performed by: FAMILY MEDICINE

## 2019-02-07 PROCEDURE — 87491 CHLMYD TRACH DNA AMP PROBE: CPT | Performed by: FAMILY MEDICINE

## 2019-02-07 RX ORDER — ALBUTEROL SULFATE 90 UG/1
2 AEROSOL, METERED RESPIRATORY (INHALATION) EVERY 4 HOURS PRN
Qty: 1 INHALER | Refills: 11 | Status: SHIPPED | OUTPATIENT
Start: 2019-02-07 | End: 2019-10-11

## 2019-02-07 ASSESSMENT — PAIN SCALES - GENERAL: PAINLEVEL: NO PAIN (0)

## 2019-02-07 ASSESSMENT — ANXIETY QUESTIONNAIRES
3. WORRYING TOO MUCH ABOUT DIFFERENT THINGS: MORE THAN HALF THE DAYS
5. BEING SO RESTLESS THAT IT IS HARD TO SIT STILL: MORE THAN HALF THE DAYS
2. NOT BEING ABLE TO STOP OR CONTROL WORRYING: MORE THAN HALF THE DAYS
6. BECOMING EASILY ANNOYED OR IRRITABLE: MORE THAN HALF THE DAYS
GAD7 TOTAL SCORE: 12
1. FEELING NERVOUS, ANXIOUS, OR ON EDGE: NEARLY EVERY DAY
7. FEELING AFRAID AS IF SOMETHING AWFUL MIGHT HAPPEN: NOT AT ALL

## 2019-02-07 ASSESSMENT — PATIENT HEALTH QUESTIONNAIRE - PHQ9
5. POOR APPETITE OR OVEREATING: SEVERAL DAYS
SUM OF ALL RESPONSES TO PHQ QUESTIONS 1-9: 7

## 2019-02-07 ASSESSMENT — MIFFLIN-ST. JEOR: SCORE: 1450.45

## 2019-02-07 NOTE — LETTER
[facility]  [address] [facility] [address]  Phone: [phone]    February 7, 2019        Amy Jauregui  [address] [facility] [address]          To whom it may concern:    RE: Amy Jauregui    Patient was seen and treated today at our clinic.  I think Amy Jauregui would benefit from a sit to stand desk.    Please contact me for questions or concerns.      Sincerely,        Cirilo Delong MD

## 2019-02-07 NOTE — PROGRESS NOTES
SUBJECTIVE:   CC: Amy Jauregui is an 23 year old woman who presents for preventive health visit.     Healthy Habits:    Do you get at least three servings of calcium containing foods daily (dairy, green leafy vegetables, etc.)? 2 servings    Amount of exercise or daily activities, outside of work: 3 hour(s) per day    Problems taking medications regularly No    Medication side effects: No    Have you had an eye exam in the past two years? yes    Do you see a dentist twice per year? no    Do you have sleep apnea, excessive snoring or daytime drowsiness?no      Chief Complaint   Patient presents with     Physical     would like a letter for work for a sit and stand desk     Asthma     med refill      History of ASCUS pap. Due for repeat pap.  Also has a history of depression. Off zoloft. Doing well.    Today's PHQ-2 Score:   PHQ-2 ( 1999 Pf[employer]) 2/7/2019 1/10/2018   Q1: Little interest or pleasure in doing things 1 1   Q2: Feeling down, depressed or hopeless 1 1   PHQ-2 Score 2 2       Abuse: Current or Past(Physical, Sexual or Emotional)- No  Do you feel safe in your environment? Yes    Social History     Tobacco Use     Smoking status: Never Smoker     Smokeless tobacco: Never Used   Substance Use Topics     Alcohol use: Yes     Comment: occ     If you drink alcohol do you typically have >3 drinks per day or >7 drinks per week? No                     Reviewed orders with patient.  Reviewed health maintenance and updated orders accordingly - Yes  Labs reviewed in EPIC  Patient Active Problem List   Diagnosis     Exercise-induced asthma     Intermittent asthma     Congenital anomaly of urinary system     Moderate major depression (H)     Pyelonephritis     Duplicated urinary collecting system     Anemia     Frequent UTI     Dysmenorrhea     Irregular menses     GURU (generalized anxiety disorder)     Upper respiratory tract infection, unspecified upper respiratory infection     ASCUS with positive high risk HPV  cervical     Insertion of Nexplanon due for removal 1/10/2021     Past Surgical History:   Procedure Laterality Date     ABDOMEN SURGERY       CYSTOSCOPY  7/10    and vaginoscopy= normal     SURGICAL HISTORY OF -   08/15/02    Right extravesical ureteral reimplant right ectopic upper pole refluxing ureter       Social History     Tobacco Use     Smoking status: Never Smoker     Smokeless tobacco: Never Used   Substance Use Topics     Alcohol use: Yes     Comment: occ     Family History   Problem Relation Age of Onset     Cardiovascular Mother      Unknown/Adopted Mother      Hypertension Maternal Grandmother      Unknown/Adopted Maternal Grandmother      Diabetes Maternal Grandfather      Unknown/Adopted Maternal Grandfather      Unknown/Adopted Father      Unknown/Adopted Brother      Unknown/Adopted Sister      Unknown/Adopted Paternal Grandmother      Unknown/Adopted Paternal Grandfather          Current Outpatient Medications   Medication Sig Dispense Refill     albuterol (PROAIR HFA/PROVENTIL HFA/VENTOLIN HFA) 108 (90 Base) MCG/ACT inhaler Inhale 2 puffs into the lungs every 4 hours as needed for shortness of breath / dyspnea 1 Inhaler 11     etonogestrel (IMPLANON/NEXPLANON) 68 MG IMPL 1 each (68 mg) by Subdermal route once for 1 dose 1 each 0     omeprazole (PRILOSEC) 20 MG CR capsule Take 1 capsule (20 mg) by mouth daily 14 capsule 0     Allergies   Allergen Reactions     Amoxicillin Rash     Sulfa Drugs Rash       Mammogram not appropriate for this patient based on age.    Pertinent mammograms are reviewed under the imaging tab.  History of abnormal Pap smear:   YES - updated in Problem List and Health Maintenance accordingly  Last 3 Pap and HPV Results:   PAP / HPV Latest Ref Rng & Units 2/7/2019 1/10/2018 12/22/2016   PAP - NIL ASC-US(A) LSIL(A)   HPV 16 DNA NEG:Negative - Negative -   HPV 18 DNA NEG:Negative - Negative -   OTHER HR HPV NEG:Negative - Positive(A) -     PAP / HPV Latest Ref Rng & Units  "1/10/2018 12/22/2016   PAP - ASC-US(A) LSIL(A)   HPV 16 DNA NEG:Negative Negative -   HPV 18 DNA NEG:Negative Negative -   OTHER HR HPV NEG:Negative Positive(A) -     Reviewed and updated as needed this visit by clinical staff  Tobacco  Allergies  Meds  Med Hx  Surg Hx  Fam Hx  Soc Hx        Reviewed and updated as needed this visit by Provider        Past Medical History:   Diagnosis Date     Papanicolaou smear of cervix with low grade squamous intraepithelial lesion (LGSIL) 12/28/2016     Vesicoureteral reflux, unspecified or without reflux nephropathy     Grade IV/grade V vesicle ureteral reflux (right) duplicated collecting system      Past Surgical History:   Procedure Laterality Date     ABDOMEN SURGERY       CYSTOSCOPY  7/10    and vaginoscopy= normal     SURGICAL HISTORY OF -   08/15/02    Right extravesical ureteral reimplant right ectopic upper pole refluxing ureter       ROS:  Constitutional, neuro, ENT, endocrine, pulmonary, cardiac, gastrointestinal, genitourinary, musculoskeletal, integument and psychiatric systems are negative, except as otherwise noted.     OBJECTIVE:   /75 (BP Location: Right arm, Cuff Size: Adult Regular)   Temp 98.1  F (36.7  C) (Tympanic)   Resp 16   Ht 1.645 m (5' 4.75\")   Wt 69.9 kg (154 lb)   LMP 01/15/2019 (Approximate)   SpO2 97%   Breastfeeding? No   BMI 25.83 kg/m    EXAM:  GENERAL: healthy, alert and no distress  EYES: Eyes grossly normal to inspection, PERRL and conjunctivae and sclerae normal  HENT: ear canals and TM's normal, nose and mouth without ulcers or lesions  NECK: no adenopathy, no asymmetry, masses, or scars and thyroid normal to palpation  RESP: lungs clear to auscultation - no rales, rhonchi or wheezes  BREAST: normal without masses, tenderness or nipple discharge and no palpable axillary masses or adenopathy  CV: regular rate and rhythm, normal S1 S2, no S3 or S4, no murmur, click or rub, no peripheral edema and peripheral pulses " "strong  ABDOMEN: soft, nontender, no hepatosplenomegaly, no masses and bowel sounds normal   (female): normal female external genitalia, normal urethral meatus, vaginal mucosa pink, moist, well rugated, and normal cervix/adnexa/uterus without masses or discharge  MS: no gross musculoskeletal defects noted, no edema  SKIN: no suspicious lesions or rashes  NEURO: Normal strength and tone, mentation intact and speech normal  PSYCH: mentation appears normal, affect normal/bright      ASSESSMENT/PLAN:   1. Well adult exam    2. Major depressive disorder, recurrent episode, moderate (H)  Stable off zoloft.     3. Exercise-induced asthma  Well controlled. Refilled medication.     - albuterol (PROAIR HFA/PROVENTIL HFA/VENTOLIN HFA) 108 (90 Base) MCG/ACT inhaler; Inhale 2 puffs into the lungs every 4 hours as needed for shortness of breath / dyspnea  Dispense: 1 Inhaler; Refill: 11    4. ASCUS with positive high risk HPV cervical  - Pap imaged thin layer diagnostic reflex to HPV if ASCUS    5. Screening for STD (sexually transmitted disease)  - Neisseria gonorrhoeae PCR  - Chlamydia trachomatis PCR    COUNSELING:   Reviewed preventive health counseling, as reflected in patient instructions    BP Readings from Last 1 Encounters:   02/07/19 111/75     Estimated body mass index is 25.83 kg/m  as calculated from the following:    Height as of this encounter: 1.645 m (5' 4.75\").    Weight as of this encounter: 69.9 kg (154 lb).      Weight management plan: Discussed healthy diet and exercise guidelines     reports that  has never smoked. she has never used smokeless tobacco.      Counseling Resources:  ATP IV Guidelines  Pooled Cohorts Equation Calculator  Breast Cancer Risk Calculator  FRAX Risk Assessment  ICSI Preventive Guidelines  Dietary Guidelines for Americans, 2010  USDA's MyPlate  ASA Prophylaxis  Lung CA Screening    Cirilo Delong MD  [facility]  "

## 2019-02-08 LAB
C TRACH DNA SPEC QL NAA+PROBE: NEGATIVE
N GONORRHOEA DNA SPEC QL NAA+PROBE: NEGATIVE
SPECIMEN SOURCE: NORMAL
SPECIMEN SOURCE: NORMAL

## 2019-02-08 ASSESSMENT — ASTHMA QUESTIONNAIRES: ACT_TOTALSCORE: 21

## 2019-02-08 ASSESSMENT — ANXIETY QUESTIONNAIRES: GAD7 TOTAL SCORE: 12

## 2019-02-11 LAB
COPATH REPORT: NORMAL
PAP: NORMAL

## 2019-02-19 ENCOUNTER — HOSPITAL ENCOUNTER (EMERGENCY)
Facility: CLINIC | Age: 24
Discharge: HOME | End: 2019-02-19
Attending: EMERGENCY MEDICINE | Admitting: EMERGENCY MEDICINE
Payer: COMMERCIAL

## 2019-02-19 VITALS
HEART RATE: 59 BPM | RESPIRATION RATE: 16 BRPM | TEMPERATURE: 97.1 F | SYSTOLIC BLOOD PRESSURE: 128 MMHG | DIASTOLIC BLOOD PRESSURE: 97 MMHG | OXYGEN SATURATION: 99 %

## 2019-02-19 DIAGNOSIS — M43.6 TORTICOLLIS, ACUTE: ICD-10-CM

## 2019-02-19 PROCEDURE — 25000132 ZZH RX MED GY IP 250 OP 250 PS 637: Performed by: EMERGENCY MEDICINE

## 2019-02-19 PROCEDURE — 20552 NJX 1/MLT TRIGGER POINT 1/2: CPT | Performed by: EMERGENCY MEDICINE

## 2019-02-19 PROCEDURE — 99284 EMERGENCY DEPT VISIT MOD MDM: CPT | Mod: 25 | Performed by: EMERGENCY MEDICINE

## 2019-02-19 PROCEDURE — 20552 NJX 1/MLT TRIGGER POINT 1/2: CPT | Mod: Z6 | Performed by: EMERGENCY MEDICINE

## 2019-02-19 PROCEDURE — 96372 THER/PROPH/DIAG INJ SC/IM: CPT | Mod: 59 | Performed by: EMERGENCY MEDICINE

## 2019-02-19 PROCEDURE — 25000128 H RX IP 250 OP 636: Performed by: EMERGENCY MEDICINE

## 2019-02-19 PROCEDURE — 25000131 ZZH RX MED GY IP 250 OP 636 PS 637: Performed by: EMERGENCY MEDICINE

## 2019-02-19 RX ORDER — CYCLOBENZAPRINE HCL 10 MG
10 TABLET ORAL ONCE
Status: COMPLETED | OUTPATIENT
Start: 2019-02-19 | End: 2019-02-19

## 2019-02-19 RX ORDER — ONDANSETRON 4 MG/1
4 TABLET, ORALLY DISINTEGRATING ORAL ONCE
Status: COMPLETED | OUTPATIENT
Start: 2019-02-19 | End: 2019-02-19

## 2019-02-19 RX ORDER — HYDROCODONE BITARTRATE AND ACETAMINOPHEN 5; 325 MG/1; MG/1
1-2 TABLET ORAL EVERY 4 HOURS PRN
Qty: 12 TABLET | Refills: 0 | Status: SHIPPED | OUTPATIENT
Start: 2019-02-19 | End: 2019-10-11

## 2019-02-19 RX ORDER — KETOROLAC TROMETHAMINE 30 MG/ML
60 INJECTION, SOLUTION INTRAMUSCULAR; INTRAVENOUS ONCE
Status: COMPLETED | OUTPATIENT
Start: 2019-02-19 | End: 2019-02-19

## 2019-02-19 RX ORDER — ONDANSETRON 4 MG/1
4 TABLET, ORALLY DISINTEGRATING ORAL EVERY 8 HOURS PRN
Qty: 10 TABLET | Refills: 1 | Status: SHIPPED | OUTPATIENT
Start: 2019-02-19 | End: 2019-02-22

## 2019-02-19 RX ORDER — HYDROCODONE BITARTRATE AND ACETAMINOPHEN 5; 325 MG/1; MG/1
2 TABLET ORAL ONCE
Status: COMPLETED | OUTPATIENT
Start: 2019-02-19 | End: 2019-02-19

## 2019-02-19 RX ORDER — CYCLOBENZAPRINE HCL 10 MG
10 TABLET ORAL 3 TIMES DAILY PRN
Qty: 24 TABLET | Refills: 0 | Status: SHIPPED | OUTPATIENT
Start: 2019-02-19 | End: 2019-10-11

## 2019-02-19 RX ORDER — ONDANSETRON 4 MG/1
4 TABLET, ORALLY DISINTEGRATING ORAL ONCE
Status: DISCONTINUED | OUTPATIENT
Start: 2019-02-19 | End: 2019-02-19

## 2019-02-19 RX ADMIN — ONDANSETRON 4 MG: 4 TABLET, ORALLY DISINTEGRATING ORAL at 22:13

## 2019-02-19 RX ADMIN — CYCLOBENZAPRINE HYDROCHLORIDE 10 MG: 10 TABLET, FILM COATED ORAL at 21:19

## 2019-02-19 RX ADMIN — KETOROLAC TROMETHAMINE 60 MG: 30 INJECTION, SOLUTION INTRAMUSCULAR at 21:20

## 2019-02-19 RX ADMIN — HYDROCODONE BITARTRATE AND ACETAMINOPHEN 2 TABLET: 5; 325 TABLET ORAL at 21:19

## 2019-02-19 NOTE — LETTER
February 19, 2019      To Whom It May Concern:      Amy Jauregui was seen in our Emergency Department today, Tuesday 02/19/19.  I expect her condition to improve over the next several days.  And please excuse her from work tomorrow, Wednesday 2/20/19 and Thursday, 2/21/19 if needed.  Sincerely,        STEVE Manuel MD

## 2019-02-19 NOTE — ED AVS SNAPSHOT
[facility]  [address] [facility] [address]  Phone:  [phone]  Fax:  [phone]                                    Amy Jauregui   MRN: [id number]    Department:  [facility]   Date of Visit:  2/19/2019           After Visit Summary Signature Page    I have received my discharge instructions, and my questions have been answered. I have discussed any challenges I see with this plan with the nurse or doctor.    ..........................................................................................................................................  Patient/Patient Representative Signature      ..........................................................................................................................................  Patient Representative Print Name and Relationship to Patient    ..................................................               ................................................  Date                                   Time    ..........................................................................................................................................  Reviewed by Signature/Title    ...................................................              ..............................................  Date                                               Time          22EPIC Rev 08/18

## 2019-02-20 ENCOUNTER — PATIENT OUTREACH (OUTPATIENT)
Dept: CARE COORDINATION | Facility: CLINIC | Age: 24
End: 2019-02-20

## 2019-02-20 NOTE — PROGRESS NOTES
Clinic Care Coordination Contact  [facility]/Voicemail    Referral Source: ED Follow-Up  Clinical Data: Care Coordinator Outreach  Outreach attempted x 1.  Left message on voicemail with call back information and requested return call.  Plan: Care Coordinator will mail out care coordination introduction letter with care coordinator contact information and explanation of care coordination services. Care Coordinator will try to reach patient again in 1-2 business days.  Jerad Odell RN  Clinic Care Coordinator  [phone] or [phone]

## 2019-02-20 NOTE — LETTER
Health Care Home - Access Care Plan    About Me  Patient Name:  Amy Jauregui    YOB: 1995  Age:                             23 year old   Dennis MRN:            [id number] Telephone Information:   Home Phone [phone]   Mobile Not on file.       [facility] [address] Email address:  [email or web address]      Emergency Contact(s)  Name Relationship Lgl Grd Work Phone Home Phone Mobile Phone   1. RICO RICHARDSON Mother   [phone]   2. SHANTA RICHARDSON Grandparent   [phone]              Health Maintenance:    Health Maintenance Due   Topic Date Due     PREVENTIVE CARE VISIT  1995     ASTHMA ACTION PLAN Q1 YR  06/26/2018     INFLUENZA VACCINE (1) 09/01/2018       My Access Plan  Medical Emergency 911   Questions or concerns during clinic hours Primary Clinic Line, I will call the clinic directly: [facility][note 3] - [phone]   24 Hour Appointment Line [phone] or  [phone]) (toll free)   24 Hour Nurse Line [phone] (toll free)   Questions or concerns outside clinic hours 24 Hour Appointment Line, I will call the after-hours on-call line:   [facility] [phone] or [phone]) (toll-free)   Preferred Urgent Care [facility], [phone]   Preferred Hospital [place] [place]  [phone]   Preferred Pharmacy [facility] [address]     Behavioral Health Crisis Line The National Suicide Prevention Lifeline at 1-218.493.6640 or 911     My Care Team Members  Patient Care Team       Relationship Specialty Notifications Start End    Cirilo Delong MD PCP - General Family Practice  1/16/19     Phone: [phone] Fax: [phone]         [address] [facility] [address]    Marie Hearn PA-C PCP - Assigned PCP   6/3/18     Phone: [phone] Fax: [phone]          [address] [facility] [address] [facility] [address]    Adama Odell, RN Clinic Care Coordinator Primary Care - CC  2/20/19     Phone: [phone] Fax: [phone] [address] [address] [facility] [address]           My Medical and Care Information  Problem List   Patient Active Problem List   Diagnosis     Exercise-induced asthma     Moderate major depression (H)     Pyelonephritis     Duplicated urinary collecting system     Anemia     Frequent UTI     Dysmenorrhea     GURU (generalized anxiety disorder)     ASCUS with positive high risk HPV cervical     Insertion of Nexplanon due for removal 1/10/2021      Current Medications and Allergies:  See printed Medication Report

## 2019-02-20 NOTE — ED PROVIDER NOTES
History     Chief Complaint   Patient presents with     Torticollis     neck pain  woke up with pain   shooting pain with movement     Headache     Nausea     HPI  Amy Jauregui is a 23 year old female who developed mild right posterior neck pain when she turned to the right to look to look while driving this morning.  Sudden onset of mild achy well localized nonradiating pain exacerbated by head movement and turning the neck which gradually worsened and now is severe, bilateral in the posterior neck, radiates to the shoulders and is constant and associated with a diffuse headache and nausea. No relief from an OTC analgesic. Now hurts more to turn the head to the left than the right, but worse pain continues to be in the posterior right neck.  No injury or trauma or known precipitant. Slept at a friend's house last night, and a bed. She is here with her grandmother who is concerned she may have meningitis  She has had no fever, chills, rash or other infectious signs or symptoms.  No visual or neurologic deficit or abnormality. No other pertinent history or acute complaints or concerns.    Allergies:  Allergies   Allergen Reactions     Amoxicillin Rash     Sulfa Drugs Rash       Problem List:    Patient Active Problem List    Diagnosis Date Noted     Insertion of Nexplanon due for removal 1/10/2021 01/14/2018     Priority: Medium     ASCUS with positive high risk HPV cervical 12/28/2016     Priority: Medium     12/22/2016:Pap--LSIL. <25 yrs of age. Plan to repeat Cytology only in 1 year.   1/10/2018 Pap: ASCUS, +HPV @ 21 yo. Plan pap only in 1 year.   2/7/19 NIL Pap @ 23 yrs old. Plan pap only in 1 year.        GURU (generalized anxiety disorder) 04/27/2015     Priority: Medium     Dysmenorrhea 03/18/2013     Priority: Medium     Frequent UTI 01/25/2013     Priority: Medium     Anemia 05/10/2012     Priority: Medium     Pyelonephritis 05/09/2012     Priority: Medium     Duplicated urinary collecting system  05/09/2012     Priority: Medium     R.  With h/o Grade 4/5 reflux.  S/p reimplantation age 5       Moderate major depression (H) 04/22/2011     Priority: Medium     Exercise-induced asthma 09/01/2009     Priority: Medium        Past Medical History:    Past Medical History:   Diagnosis Date     Congenital anomaly of urinary system 5/10/2010     GURU (generalized anxiety disorder) 4/27/2015     Moderate major depression (H) 4/22/2011     Papanicolaou smear of cervix with low grade squamous intraepithelial lesion (LGSIL) 12/28/2016     Vesicoureteral reflux, unspecified or without reflux nephropathy        Past Surgical History:    Past Surgical History:   Procedure Laterality Date     ABDOMEN SURGERY       CYSTOSCOPY  7/10    and vaginoscopy= normal     SURGICAL HISTORY OF -   08/15/02    Right extravesical ureteral reimplant right ectopic upper pole refluxing ureter       Family History:    Family History   Problem Relation Age of Onset     Cardiovascular Mother      Unknown/Adopted Mother      Hypertension Maternal Grandmother      Unknown/Adopted Maternal Grandmother      Diabetes Maternal Grandfather      Unknown/Adopted Maternal Grandfather      Unknown/Adopted Father      Unknown/Adopted Brother      Unknown/Adopted Sister      Unknown/Adopted Paternal Grandmother      Unknown/Adopted Paternal Grandfather        Social History:  Marital Status:  Single [1]  Social History     Tobacco Use     Smoking status: Never Smoker     Smokeless tobacco: Never Used   Substance Use Topics     Alcohol use: Yes     Comment: occ     Drug use: No        Medications:      cyclobenzaprine (FLEXERIL) 10 MG tablet   HYDROcodone-acetaminophen (NORCO) 5-325 MG tablet   ondansetron (ZOFRAN ODT) 4 MG ODT tab   albuterol (PROAIR HFA/PROVENTIL HFA/VENTOLIN HFA) 108 (90 Base) MCG/ACT inhaler   etonogestrel (IMPLANON/NEXPLANON) 68 MG IMPL   omeprazole (PRILOSEC) 20 MG CR capsule       Review of Systems  As mentioned above in the HPI,  otherwise focused 10 point ROS was reviewed and was negative.  Constitutional: no fever or chills.  Ears, Nose,Throat: no sore throat or difficulty swallowing.  Respiratory: no cough or shortness of breath.  Cardiovascular: no chest pain or leg swelling.  Gastrointestinal: no vomiting or abdominal pain.  Genitourinary: no acute difficulty urinating or UTI symptoms.  Musculoskeletal: no joint pain or swelling.  Skin: no rash or acute skin changes.  Neurologic: no focal weakness or numbness.  Hematologic: no unusual bleeding or bruising.    Physical Exam   BP: (!) 128/97  Pulse: 59  Temp: 97.1  F (36.2  C)  Resp: 16  SpO2: 99 %      Physical Exam   Constitutional: She is oriented to person, place, and time. She appears well-developed and well-nourished. No distress.   HENT:   Head: Normocephalic and atraumatic.   Right Ear: External ear normal.   Left Ear: External ear normal.   Mouth/Throat: Oropharynx is clear and moist.   Eyes: Conjunctivae and EOM are normal. Pupils are equal, round, and reactive to light. No scleral icterus.   Neck: Trachea normal and phonation normal. Neck supple. Normal carotid pulses present. Muscular tenderness ( Bilateral paraspinous and trapezius muscular tenderness with spasm in the paraspinous musculature, R>L) present. No spinous process tenderness present. Carotid bruit is not present. No neck rigidity. No tracheal deviation, no edema and no erythema present. No thyromegaly present.   Cardiovascular: Normal rate, regular rhythm and normal heart sounds. Exam reveals no gallop and no friction rub.   No murmur heard.  Pulmonary/Chest: Effort normal and breath sounds normal. No respiratory distress. She has no wheezes. She has no rales.   Abdominal: Soft. She exhibits no distension. There is no tenderness.   Musculoskeletal: Normal range of motion. She exhibits no edema or tenderness.   Lymphadenopathy:     She has no cervical adenopathy.   Neurological: She is alert and oriented to person,  place, and time. She has normal strength. No cranial nerve deficit (2-12 intact) or sensory deficit. Coordination normal.   Skin: Skin is warm and dry. No rash noted. She is not diaphoretic. No erythema. No pallor.   Psychiatric: She has a normal mood and affect. Her behavior is normal.   Nursing note and vitals reviewed.      ED Course        Procedures    Trigger point injection procedure: Verbal consent was obtained.  Right posterior neck sterile skin preparation with chlorhexidine solution. Approximately 1 mL bupivacaine 0.25% without epinephrine injected in the right paraspinous musculature without complication.    Repeat trigger point injection: Verbal consent was obtained.  Posterior right neck sterile skin preparation with chlorhexidine solution. Approximately 1 mL bupivacaine 0.25% without epinephrine was injected into the right paraspinous musculature inferior to the initial injection site, without complication.             No results found for this or any previous visit (from the past 24 hour(s)).    Medications   HYDROcodone-acetaminophen (NORCO) 5-325 MG per tablet 2 tablet (2 tablets Oral Given 2/19/19 2119)   cyclobenzaprine (FLEXERIL) tablet 10 mg (10 mg Oral Given 2/19/19 2119)   ketorolac (TORADOL) injection 60 mg (60 mg Intramuscular Given 2/19/19 2120)   ondansetron (ZOFRAN-ODT) ODT tab 4 mg (4 mg Oral Given 2/19/19 2213)       Mild improvement after right paraspinous muscular trigger point injection with bupivacaine 0.25% without epinephrine.    A second right paraspinous musculature trigger point injection was performed with excellent pain relief.  This was performed just inferior to the initial right paraspinous musculature trigger point injection.  She reports comfortable with morning discharge home.    Assessments & Plan (with Medical Decision Making)   History, signs and symptoms are consistent with neck paraspinous strain and spasm with development of bilateral trapezius involvement and a  tension headache.  Doubt meningitis, epidural abscess, dissection or emergent disease process.  She had good relief of discomfort with trigger point injections with bupivacaine.  Will discharge with instructions use NSAID, Norco if needed for pain and Flexeril for spasm.  A physical therapy referral was made for her.  I recommended recheck in primary care clinic as well.  She was provided instructions for supportive care and will return as needed for worsened condition or worsening symptoms, or new problems or concerns.      I have reviewed the nursing notes.    I have reviewed the findings, diagnosis, plan and need for follow up with the patient.       Medication List      Started    cyclobenzaprine 10 MG tablet  Commonly known as:  FLEXERIL  10 mg, Oral, 3 TIMES DAILY PRN     HYDROcodone-acetaminophen 5-325 MG tablet  Commonly known as:  NORCO  1-2 tablets, Oral, EVERY 4 HOURS PRN, maximum 6 tablet(s) per day     ondansetron 4 MG ODT tab  Commonly known as:  ZOFRAN ODT  4 mg, Oral, EVERY 8 HOURS PRN            Final diagnoses:   Torticollis, acute       2/19/2019   [facility] EMERGENCY DEPARTMENT     Attila Manuel MD  02/24/19 2341

## 2019-02-20 NOTE — ED NOTES
"Pt and grandmother requesting conversation with RN team. Grandmother states \" she rushed her to get to UC, and never asked to go to Urgent Care. Now she is here in the Emergency Room\" Discussed ED vs UC, discussed triage assessment of ED visit, and having conversation with MD. Verbalized understanding. Pt awaiting MD visit, desires water.   "

## 2019-02-20 NOTE — ED NOTES
"Pt here with neck stiffness, first noticed it around 0800 this morning. Pt states that the stiffness and pain has gotten worse as the day has progressed and now feels some stiffness in her shoulders and has a headache. States she felt nauseated yesterday and most of today but was able to eat this evening, no longer feeling nauseated. Pt has not taken anything for pain today. She can move her head chin to chest with little pain but states that turning head from side to side causes \"shooting\" pain.  "

## 2019-02-20 NOTE — LETTER
[facility] CARE COORDINATION  [facility]  [address]  [facility] [address]  Phone: [phone]    February 20, 2019    Amy Jauregui  [address] [facility] [address]      Dear Amy,    I am a clinic care coordinator who works with Cirilo Delong MD at [facility]. I recently tried to call and was unable to reach you. I wanted to introduce myself and provide you with my contact information so that you can call me with questions or concerns about your health care. Below is a description of clinic care coordination and how I can further assist you.     The clinic care coordinator is a registered nurse and/or  who understand the health care system. The goal of clinic care coordination is to help you manage your health and improve access to the [facility] system in the most efficient manner. The registered nurse can assist you in meeting your health care goals by providing education, coordinating services, and strengthening the communication among your providers. The  can assist you with financial, behavioral, psychosocial, chemical dependency, counseling, and/or psychiatric resources.    Please feel free to contact me at [phone], [phone], with any questions or concerns. We at [facility] are focused on providing you with the highest-quality healthcare experience possible and that all starts with you.     Sincerely,     Jerad Odell RN  Clinic Care Coordinator    Enclosed: I have enclosed a copy of a 24 Hour Access Plan. This has helpful phone numbers for you to call when needed. Please keep this in an easy to access place to use as needed.

## 2019-03-01 NOTE — PROGRESS NOTES
Clinic Care Coordination Contact  [facility]/Voicemail    Referral Source: ED Follow-Up  Clinical Data: Care Coordinator Outreach  Outreach attempted x 2.  Left message on voicemail with call back information and requested return call.  Plan: Care Coordinator mailed out care coordination introduction letter on 2-20. Care Coordinator will do no further outreaches at this time.  Jerad Odell RN  Clinic Care Coordinator  [phone] or [phone]

## 2019-10-11 ENCOUNTER — OFFICE VISIT (OUTPATIENT)
Dept: FAMILY MEDICINE | Facility: CLINIC | Age: 24
End: 2019-10-11
Payer: COMMERCIAL

## 2019-10-11 VITALS
HEART RATE: 97 BPM | HEIGHT: 65 IN | WEIGHT: 163 LBS | SYSTOLIC BLOOD PRESSURE: 108 MMHG | DIASTOLIC BLOOD PRESSURE: 70 MMHG | OXYGEN SATURATION: 100 % | BODY MASS INDEX: 27.16 KG/M2 | TEMPERATURE: 98 F | RESPIRATION RATE: 18 BRPM

## 2019-10-11 DIAGNOSIS — N92.1 BREAKTHROUGH BLEEDING ON NEXPLANON: Primary | ICD-10-CM

## 2019-10-11 DIAGNOSIS — K21.9 GASTROESOPHAGEAL REFLUX DISEASE, ESOPHAGITIS PRESENCE NOT SPECIFIED: ICD-10-CM

## 2019-10-11 DIAGNOSIS — J45.990 EXERCISE-INDUCED ASTHMA: ICD-10-CM

## 2019-10-11 DIAGNOSIS — Z97.5 BREAKTHROUGH BLEEDING ON NEXPLANON: Primary | ICD-10-CM

## 2019-10-11 DIAGNOSIS — Z12.4 SCREENING FOR CERVICAL CANCER: ICD-10-CM

## 2019-10-11 DIAGNOSIS — N93.9 VAGINAL BLEEDING, ABNORMAL: ICD-10-CM

## 2019-10-11 DIAGNOSIS — R87.810 ASCUS WITH POSITIVE HIGH RISK HPV CERVICAL: ICD-10-CM

## 2019-10-11 DIAGNOSIS — R87.610 ASCUS WITH POSITIVE HIGH RISK HPV CERVICAL: ICD-10-CM

## 2019-10-11 LAB — HCG UR QL: NEGATIVE

## 2019-10-11 PROCEDURE — G0145 SCR C/V CYTO,THINLAYER,RESCR: HCPCS | Performed by: FAMILY MEDICINE

## 2019-10-11 PROCEDURE — 81025 URINE PREGNANCY TEST: CPT | Performed by: FAMILY MEDICINE

## 2019-10-11 PROCEDURE — 87491 CHLMYD TRACH DNA AMP PROBE: CPT | Performed by: FAMILY MEDICINE

## 2019-10-11 PROCEDURE — 99214 OFFICE O/P EST MOD 30 MIN: CPT | Performed by: FAMILY MEDICINE

## 2019-10-11 PROCEDURE — 87591 N.GONORRHOEAE DNA AMP PROB: CPT | Performed by: FAMILY MEDICINE

## 2019-10-11 RX ORDER — ALBUTEROL SULFATE 90 UG/1
2 AEROSOL, METERED RESPIRATORY (INHALATION) EVERY 4 HOURS PRN
Qty: 1 INHALER | Refills: 11 | Status: SHIPPED | OUTPATIENT
Start: 2019-10-11 | End: 2020-10-23

## 2019-10-11 RX ORDER — ESTRADIOL 2 MG/1
2 TABLET ORAL DAILY
Qty: 30 TABLET | Refills: 1 | Status: SHIPPED | OUTPATIENT
Start: 2019-10-11 | End: 2020-10-23

## 2019-10-11 ASSESSMENT — ASTHMA QUESTIONNAIRES
QUESTION_2 LAST FOUR WEEKS HOW OFTEN HAVE YOU HAD SHORTNESS OF BREATH: NOT AT ALL
QUESTION_4 LAST FOUR WEEKS HOW OFTEN HAVE YOU USED YOUR RESCUE INHALER OR NEBULIZER MEDICATION (SUCH AS ALBUTEROL): NOT AT ALL
ACUTE_EXACERBATION_TODAY: NO
ACT_TOTALSCORE: 25
QUESTION_3 LAST FOUR WEEKS HOW OFTEN DID YOUR ASTHMA SYMPTOMS (WHEEZING, COUGHING, SHORTNESS OF BREATH, CHEST TIGHTNESS OR PAIN) WAKE YOU UP AT NIGHT OR EARLIER THAN USUAL IN THE MORNING: NOT AT ALL
QUESTION_1 LAST FOUR WEEKS HOW MUCH OF THE TIME DID YOUR ASTHMA KEEP YOU FROM GETTING AS MUCH DONE AT WORK, SCHOOL OR AT HOME: NONE OF THE TIME
QUESTION_5 LAST FOUR WEEKS HOW WOULD YOU RATE YOUR ASTHMA CONTROL: COMPLETELY CONTROLLED

## 2019-10-11 ASSESSMENT — PAIN SCALES - GENERAL: PAINLEVEL: NO PAIN (0)

## 2019-10-11 ASSESSMENT — MIFFLIN-ST. JEOR: SCORE: 1491.27

## 2019-10-11 ASSESSMENT — PATIENT HEALTH QUESTIONNAIRE - PHQ9: SUM OF ALL RESPONSES TO PHQ QUESTIONS 1-9: 4

## 2019-10-11 NOTE — PATIENT INSTRUCTIONS
Take one pill (Estradiol 2 mg) daily for 7 days.   You may repeat this if the bleeding returns.     You can also try taking Naproxen 400 mg twice daily.         Our Clinic hours are:  Mondays    7:20 am - 7 pm  Tues -  Fri  7:20 am - 5 pm    Clinic Phone: [phone]    The clinic lab opens at 7:30 am Mon - Fri and appointments are required.    [facility]  Ph. [phone]  Monday  8 am - 7pm  Tues - Fri 8 am - 5:30 pm

## 2019-10-11 NOTE — PROGRESS NOTES
"Subjective     Amy Jauregui is a 23 year old female who presents to clinic today for the following health issues:    HPI   Vaginal Bleeding (Dysmenorrhea)  Chief Complaint   Patient presents with     Abnormal Bleeding Problem     Patient has the nexaplon and has noticed that her period are changing and she will get them every 6 weeks and then she will have spotting for 11 to 12 days. Patient was told to follow-up and possible get on a oral birth control medication to help regulate.      Flu Shot     getting through work       Onset: 2 months    Description:   Duration of bleeding episodes:14 dyas  Frequency between periods:  2-3 months  Describe bleeding/flow:   Clots: no  Number of pads/hour: 1  Cramping: before    Accompanying Signs & Symptoms:  Weakness: no  Lightheadedness: no  Hot flashes: YES  Nosebleeds/Easy bruising: no  Vaginal Discharge: YES    History:  Patient's last menstrual period was 10/09/2019.  Previous normal periods: no  Contraceptive use: Nexplanon  Possibility of Pregnancy: no  Any bleeding after intercourse: no  Age of first period (menarche): 13  Abnormal PAP Smears: YES    Precipitating factors:       Alleviating factors:      Therapies Tried and outcome:     Has had BTB since starting nexplanon 22 months ago.   Bleeds on average every 6 weeks, but it seems a bit heavier over the past three months or so.  Generally spotting  X 14 days but has been enough to need to wear a tampon more recently.    It's not \"unbearable\" but more \"annoying\".      Periods were never really all that regular before starting the nexplanon per patient.     Reviewed and updated as needed this visit by Provider         Review of Systems   ROS COMP: Constitutional, HEENT, cardiovascular, pulmonary, gi and gu systems are negative, except as otherwise noted.      Objective    /70   Pulse 97   Temp 98  F (36.7  C) (Tympanic)   Resp 18   Ht 1.645 m (5' 4.75\")   Wt 73.9 kg (163 lb)   LMP 10/09/2019   SpO2 " "100%   Breastfeeding? No   BMI 27.33 kg/m    Body mass index is 27.33 kg/m .  Physical Exam   GENERAL: healthy, alert and no distress   (female): normal female external genitalia, normal urethral meatus , vaginal mucosa pink, moist, well rugated, normal cervix, adnexae, and uterus without masses. and pap and GC/Chl obtained.  Small amount of blood from the cervical os and in the vaginal vault.            Assessment & Plan     1. Breakthrough bleeding on Nexplanon  Considered NSAIDS (celebrex) but patient is allergic to sulfa and there is cross over with the celebrex.  Will have her start oral estrogen only at 2 mg daily x 7 days.  This should stop the bleeding and hopefully lengthen the time between bleeding episodes.  #30 given to use 7 days now and with future bleeding.     - estradiol (ESTRACE) 2 MG tablet; Take 1 tablet (2 mg) by mouth daily  Dispense: 30 tablet; Refill: 1    2. Vaginal bleeding, abnormal   as above  - HCG Qual, Urine ([id number])  - Neisseria gonorrhoeae PCR  - Chlamydia trachomatis PCR    3. Exercise-induced asthma  Uses infrequently  - albuterol (PROAIR HFA/PROVENTIL HFA/VENTOLIN HFA) 108 (90 Base) MCG/ACT inhaler; Inhale 2 puffs into the lungs every 4 hours as needed for shortness of breath / dyspnea  Dispense: 1 Inhaler; Refill: 11    4. Gastroesophageal reflux disease, esophagitis presence not specified  Uses about once a week  - omeprazole (PRILOSEC) 20 MG DR capsule; Take 1 capsule (20 mg) by mouth daily  Dispense: 90 capsule; Refill: 1    5. Screening for cervical cancer  Pap obtained  - Pap imaged thin layer screen only - recommended age 21 - 24 years     BMI:   Estimated body mass index is 27.33 kg/m  as calculated from the following:    Height as of this encounter: 1.645 m (5' 4.75\").    Weight as of this encounter: 73.9 kg (163 lb).   Weight management plan: Discussed healthy diet and exercise guidelines            No follow-ups on file.    Keely Max MD  [facility] [place]

## 2019-10-12 ASSESSMENT — ASTHMA QUESTIONNAIRES: ACT_TOTALSCORE: 25

## 2019-10-17 LAB
COPATH REPORT: NORMAL
PAP: NORMAL

## 2019-11-03 ENCOUNTER — HEALTH MAINTENANCE LETTER (OUTPATIENT)
Age: 24
End: 2019-11-03

## 2020-10-23 ENCOUNTER — OFFICE VISIT (OUTPATIENT)
Dept: FAMILY MEDICINE | Facility: CLINIC | Age: 25
End: 2020-10-23
Payer: COMMERCIAL

## 2020-10-23 VITALS
WEIGHT: 173.6 LBS | HEART RATE: 105 BPM | RESPIRATION RATE: 16 BRPM | OXYGEN SATURATION: 97 % | BODY MASS INDEX: 28.92 KG/M2 | HEIGHT: 65 IN | DIASTOLIC BLOOD PRESSURE: 70 MMHG | TEMPERATURE: 98.1 F | SYSTOLIC BLOOD PRESSURE: 102 MMHG

## 2020-10-23 DIAGNOSIS — Z30.46 NEXPLANON REMOVAL: Primary | ICD-10-CM

## 2020-10-23 DIAGNOSIS — Z30.430 ENCOUNTER FOR IUD INSERTION: Primary | ICD-10-CM

## 2020-10-23 DIAGNOSIS — Z11.3 SCREEN FOR STD (SEXUALLY TRANSMITTED DISEASE): ICD-10-CM

## 2020-10-23 DIAGNOSIS — Z23 NEED FOR PROPHYLACTIC VACCINATION AND INOCULATION AGAINST INFLUENZA: ICD-10-CM

## 2020-10-23 DIAGNOSIS — J45.990 EXERCISE-INDUCED ASTHMA: ICD-10-CM

## 2020-10-23 DIAGNOSIS — K21.9 GERD WITHOUT ESOPHAGITIS: ICD-10-CM

## 2020-10-23 PROBLEM — Z30.017 INSERTION OF IMPLANTABLE SUBDERMAL CONTRACEPTIVE: Status: RESOLVED | Noted: 2018-01-14 | Resolved: 2020-10-23

## 2020-10-23 LAB — HCG UR QL: NEGATIVE

## 2020-10-23 PROCEDURE — 58300 INSERT INTRAUTERINE DEVICE: CPT | Performed by: FAMILY MEDICINE

## 2020-10-23 PROCEDURE — 81025 URINE PREGNANCY TEST: CPT | Performed by: FAMILY MEDICINE

## 2020-10-23 PROCEDURE — 90471 IMMUNIZATION ADMIN: CPT | Performed by: FAMILY MEDICINE

## 2020-10-23 PROCEDURE — 90686 IIV4 VACC NO PRSV 0.5 ML IM: CPT | Performed by: FAMILY MEDICINE

## 2020-10-23 PROCEDURE — 87491 CHLMYD TRACH DNA AMP PROBE: CPT | Performed by: FAMILY MEDICINE

## 2020-10-23 PROCEDURE — 11982 REMOVE DRUG IMPLANT DEVICE: CPT | Performed by: FAMILY MEDICINE

## 2020-10-23 PROCEDURE — 99213 OFFICE O/P EST LOW 20 MIN: CPT | Mod: 25 | Performed by: FAMILY MEDICINE

## 2020-10-23 RX ORDER — ALBUTEROL SULFATE 90 UG/1
2 AEROSOL, METERED RESPIRATORY (INHALATION) EVERY 4 HOURS PRN
Qty: 1 INHALER | Refills: 11 | Status: SHIPPED | OUTPATIENT
Start: 2020-10-23

## 2020-10-23 ASSESSMENT — PATIENT HEALTH QUESTIONNAIRE - PHQ9
SUM OF ALL RESPONSES TO PHQ QUESTIONS 1-9: 9
5. POOR APPETITE OR OVEREATING: SEVERAL DAYS

## 2020-10-23 ASSESSMENT — ANXIETY QUESTIONNAIRES
5. BEING SO RESTLESS THAT IT IS HARD TO SIT STILL: SEVERAL DAYS
2. NOT BEING ABLE TO STOP OR CONTROL WORRYING: SEVERAL DAYS
7. FEELING AFRAID AS IF SOMETHING AWFUL MIGHT HAPPEN: NOT AT ALL
IF YOU CHECKED OFF ANY PROBLEMS ON THIS QUESTIONNAIRE, HOW DIFFICULT HAVE THESE PROBLEMS MADE IT FOR YOU TO DO YOUR WORK, TAKE CARE OF THINGS AT HOME, OR GET ALONG WITH OTHER PEOPLE: SOMEWHAT DIFFICULT
1. FEELING NERVOUS, ANXIOUS, OR ON EDGE: MORE THAN HALF THE DAYS
3. WORRYING TOO MUCH ABOUT DIFFERENT THINGS: SEVERAL DAYS
6. BECOMING EASILY ANNOYED OR IRRITABLE: MORE THAN HALF THE DAYS
GAD7 TOTAL SCORE: 8

## 2020-10-23 ASSESSMENT — ASTHMA QUESTIONNAIRES
QUESTION_4 LAST FOUR WEEKS HOW OFTEN HAVE YOU USED YOUR RESCUE INHALER OR NEBULIZER MEDICATION (SUCH AS ALBUTEROL): NOT AT ALL
QUESTION_1 LAST FOUR WEEKS HOW MUCH OF THE TIME DID YOUR ASTHMA KEEP YOU FROM GETTING AS MUCH DONE AT WORK, SCHOOL OR AT HOME: NONE OF THE TIME
QUESTION_3 LAST FOUR WEEKS HOW OFTEN DID YOUR ASTHMA SYMPTOMS (WHEEZING, COUGHING, SHORTNESS OF BREATH, CHEST TIGHTNESS OR PAIN) WAKE YOU UP AT NIGHT OR EARLIER THAN USUAL IN THE MORNING: NOT AT ALL
ACT_TOTALSCORE: 24
QUESTION_5 LAST FOUR WEEKS HOW WOULD YOU RATE YOUR ASTHMA CONTROL: WELL CONTROLLED
QUESTION_2 LAST FOUR WEEKS HOW OFTEN HAVE YOU HAD SHORTNESS OF BREATH: NOT AT ALL

## 2020-10-23 ASSESSMENT — PAIN SCALES - GENERAL: PAINLEVEL: NO PAIN (0)

## 2020-10-23 ASSESSMENT — MIFFLIN-ST. JEOR: SCORE: 1534.35

## 2020-10-23 NOTE — PATIENT INSTRUCTIONS
Our Clinic hours are:  Mondays    7:20 am - 7 pm  Tues -  Fri  7:20 am - 5 pm    Clinic Phone: [phone]    The clinic lab opens at 7:30 am Mon - Fri and appointments are required.    [facility]. [phone]  Monday  8 am - 7pm  Tues - Fri 8 am - 5:30 pm

## 2020-10-23 NOTE — PROGRESS NOTES
Subjective     Amy Jauregui is a 24 year old female who presents to clinic today for the following health issues:    HPI   Chief Complaint   Patient presents with     Contraception     iud consult      Abnormal Bleeding Problem     Patient has had spotting since Monday. Patient was on Nexplanon.     STD     Would like screening              Asthma Follow-Up    Was ACT completed today?    Yes    ACT Total Scores 10/23/2020   ACT TOTAL SCORE -   ASTHMA ER VISITS -   ASTHMA HOSPITALIZATIONS -   ACT TOTAL SCORE (Goal Greater than or Equal to 20) 24   In the past 12 months, how many times did you visit the emergency room for your asthma without being admitted to the hospital? 0   In the past 12 months, how many times were you hospitalized overnight because of your asthma? 0          How many days per week do you miss taking your asthma controller medication?  I do not have an asthma controller medication    Please describe any recent triggers for your asthma: exercise or sports    Have you had any Emergency Room Visits, Urgent Care Visits, or Hospital Admissions since your last office visit?  No    Amy Jauregui is a 24 year old female who presents today requesting placement of an Mirena iud.    The patient meets and is agreeable to the following conditions:     She is not interested in conception in the near future.   She currently is in a stable, monogamous relationship.   There is no previous history of pelvic inflammatory disease.   There is no previous history of ectopic pregnancy.   She is willing to check monthly for the IUD string.   She is at least 8 weeks post-partum.n/a  There is no history of unresolved abnormal uterine bleeding.   There is no history of an unresolved abnormal PAP smear.   She has no history of Jevon's disease or an allergy to copper (for ParaGard).   She has no history of diabetes, AIDS, leukemia, IV drug use or chronic steroid use.   She is willing to return as directed for PAP  smears.    She denies the possibility of pregnancy.   Pregnancy test today is negative.     The following risks were discussed with the patient:  Possibility of pregnancy and ectopic pregnancy.   Possibility of pelvic inflammatory disease, particularly with new partners.   Risk of uterine perforation or IUD expulsion.   Possibility of difficult removal.   Spotting or heavy bleeding.   Cramping, pain or infection during or after insertion.     The patient was given patient information on the IUD and the patient education brochure from the .   The patient has given consent to proceed with placement of the IUD.  A written consent form is present in the chart.   She wishes to proceed.    PROCEDURE:    Type of IUD: Mirena    The patient has taken 600-800mg of Ibuprofen prior to the procedure.    She is placed in a dorsal lithotomy potion and a pelvic exam is performed to determine the position of the uterus.  The cervix is identified and cleaned with betadine three times.  A single tooth tenaculum is applied to the anterior lip of the cervix for stabilization.  The uterus is sounded to 6.5 cm and removed. (Target sound depth is 6.5 cm to 8.5 cm.)  The IUD insertion tube is prepared to manufacturers recommendations and inserted into the uterus under sterile conditions to the sounding depth.   The arms of the IUD are then opened by withdrawing the insertion tube 2.0 cm while stabilizing the solid insertion nazario without difficulty.  The IUD string is then cut to 4.0 cm.    The patient tolerated this procedure without immediate complication.  The patient is to return or call immediately for any unexplained fever, abdominal or pelvic pain, excessive bleeding, possibility of pregnancy, foul-smelling discharge, sense that the IUD has been expelled.    Keely Max MD

## 2020-10-23 NOTE — PROGRESS NOTES
Here for Nexplanon removal. Planing on IUD insertions after. Also needs refills of omeprazole and albuterol GERD and asthma well controlled.    PROCEDURE: Nexplanon  removal  Performing Physician: Cirilo Delong MD     Anesthesia: 1cc 1% Lidocaine w/ epinephrine    Procedure: Consent obtained. The area surrounding the distal edge of Nexplanon was prepared with Choloraprep and draped in the usual sterile manner. The site was anesthetized with lidocaine. A perpendicular skin incision was made over the distal aspect of the device. The capsule lysed sharply and the device removed using a hemostat. Hemostasis was assured. The site was dressed with SteriStrips and a pressure dressing. The patient tolerated the procedure well.     Followup: The patient tolerated the procedure well without complications. Standard post-procedure care is explained and return precautions are given. Contraception is advised until conception is desired.  Assessment and plan:    1. Nexplanon removal  As above.  - REMOVAL NEXPLANON    2. Exercise-induced asthma  Well controlled. Refilled medication.     - albuterol (PROAIR HFA/PROVENTIL HFA/VENTOLIN HFA) 108 (90 Base) MCG/ACT inhaler; Inhale 2 puffs into the lungs every 4 hours as needed for shortness of breath / dyspnea  Dispense: 1 Inhaler; Refill: 11    3. GERD without esophagitis  Well controlled. Refilled medication.     - omeprazole (PRILOSEC) 20 MG DR capsule; Take 1 capsule (20 mg) by mouth daily  Dispense: 90 capsule; Refill: 4

## 2020-10-24 ASSESSMENT — ASTHMA QUESTIONNAIRES: ACT_TOTALSCORE: 24

## 2020-10-24 ASSESSMENT — ANXIETY QUESTIONNAIRES: GAD7 TOTAL SCORE: 8

## 2020-10-25 LAB
C TRACH DNA SPEC QL NAA+PROBE: NEGATIVE
SPECIMEN SOURCE: NORMAL

## 2020-11-16 ENCOUNTER — HEALTH MAINTENANCE LETTER (OUTPATIENT)
Age: 25
End: 2020-11-16

## 2020-12-06 ENCOUNTER — E-VISIT (OUTPATIENT)
Dept: URGENT CARE | Facility: URGENT CARE | Age: 25
End: 2020-12-06
Payer: COMMERCIAL

## 2020-12-06 DIAGNOSIS — Z20.822 SUSPECTED COVID-19 VIRUS INFECTION: Primary | ICD-10-CM

## 2020-12-06 PROCEDURE — 99421 OL DIG E/M SVC 5-10 MIN: CPT | Performed by: FAMILY MEDICINE

## 2020-12-07 NOTE — PATIENT INSTRUCTIONS
Dear Amy Jauregui,    Your symptoms show that you may have coronavirus (COVID-19). This illness can cause fever, cough and trouble breathing. Many people get a mild case and get better on their own. Some people can get very sick.    For the UTI symptoms, we would have you submit a separate e-visit.    Will I be tested for COVID-19?  We would like to test you for Covid-19 virus. I have placed orders for this test.   To schedule: go to your [employer] home page and scroll down to the section that says  You have an appointment that needs to be scheduled  and click the large green button that says  Schedule Now  and follow the steps to find the next available openings.    If you are unable to complete these [employer] scheduling steps, please call [phone] to schedule your testing.     When it's time for your COVID test:  Stay at least 6 feet away from others. (If someone will drive you to your test, stay in the backseat, as far away from the  as you can.)  Cover your mouth and nose with a mask, tissue or washcloth.  Go straight to the testing site. Don't make any stops on the way there or back.    Starting now:     Do not go to work. Follow your usual processes for taking time away from work.  o If you receive a negative COVID-19 test result and were NOT exposed to someone with a known positive COVID-19 test, you can return to work once you're free of fever for 24 hours without fever-reducing medication and your symptoms are improving or resolved.  o If you receive a positive COVID-19 test result, return to work should be at least 10 days from symptom onset (20 days if people have immune compromise) and people should be fever-free for 24 hours without medications, and the respiratory symptoms should be improved significantly before returning to work or school  o If you were exposed to someone who has tested positive for COVID-19, you can return to work 14 days after your last contact with the positive  "individual, provided you do not have symptoms at all during that time.    During this time, don't leave the house except for testing or medical care.  o Stay in your own room, even for meals. Use your own bathroom if you can.  o Stay away from others in your home. No hugging, kissing or shaking hands. No visitors.  o Don't go to work, school or anywhere else.    Clean \"high touch\" surfaces often (doorknobs, counters, handles, etc.). Use a household cleaning spray or wipes. You'll find a full list of  on the EPA website: www.epa.gov/pesticide-registration/list-n-disinfectants-use-against-sars-cov-2.    Cover your mouth and nose with a mask, tissue or washcloth to avoid spreading germs.    Wash your hands and face often. Use soap and water.    People in these groups are at risk for severe illness due to COVID-19:  o People 65 years and older  o People who live in a nursing home or long-term care facility  o People with chronic disease (lung, heart, cancer, diabetes, kidney, liver, immunologic)  o People who have a weakened immune system, including those who:  - Are in cancer treatment  - Take medicine that weakens the immune system, such as corticosteroids  - Had a bone marrow or organ transplant  - Have an immune deficiency  - Have poorly controlled HIV or AIDS  - Are obese (body mass index of 40 or higher)  - Smoke regularly      Caregivers should wear gloves while washing dishes, handling laundry and cleaning bedrooms and bathrooms.    Use caution when washing and drying laundry: Don't shake dirty laundry, and use the warmest water setting that you can.    For more tips, go to www.cdc.gov/coronavirus/2019-ncov/downloads/10Things.pdf.    Sign up for [employer]. We know it's scary to hear that you might have COVID-19. We want to track your symptoms to make sure you're okay over the next 2 weeks. Please look for an email from [employer]--this is a free, online program that we'll use to keep in touch. To " sign up, follow the link in the email you will receive. Learn more at [email or web address]    How can I take care of myself?    Get lots of rest. Drink extra fluids (unless a doctor has told you not to)    Take Tylenol (acetaminophen) for fever or pain. If you have liver or kidney problems, ask your family doctor if it's okay to take Tylenol.  Adults can take either:    650 mg (two 325 mg pills) every 4 to 6 hours, or     1,000 mg (two 500 mg pills) every 8 hours as needed.    Note: Don't take more than 3,000 mg in one day. Acetaminophen is found in many medicines (both prescribed and over-the-counter medicines). Read all labels to be sure you don't take too much.  For children, check the Tylenol bottle for the right dose. The dose is based on the child's age or weight.    If you have other health problems (like cancer, heart failure, an organ transplant or severe kidney disease): Call your specialty clinic if you don't feel better in the next 2 days.    Know when to call 911. Emergency warning signs include:  Trouble breathing or shortness of breath  Pain or pressure in the chest that doesn't go away  Feeling confused like you haven't felt before, or not being able to wake up  Bluish-colored lips or face    Where can I get more information?    [facility] - About COVID-19: [email or web address]  CDC - What to Do If You're Sick: www.cdc.gov/coronavirus/2019-ncov/about/steps-when-sick.html    December 7, 2020    RE:  Amy Jauregui                                                                                                                                                       [address] [place] [id number]        To whom it may concern:    I evaluated Amy Jauregui on December 7, 2020. Amy Jauregui should be excused from work/school.     o    ? If you receive a positive COVID-19 test result, Return to work should be at least 10 days from when symptoms first  started (20 days if immunocompromised) and people should be fever-free for 24 hours without medications, and the respiratory symptoms should be improved significantly before returning to work or school.     o    ? If you receive a negative COVID-19 test result and did not have a high risk exposure to someone with a known positive COVID-19 test, you can return to work once you're free of fever for 24 hours without fever-reducing medication and your symptoms are improving or resolved.      Sincerely,  Juan Oswald MD

## 2020-12-08 ENCOUNTER — MYC MEDICAL ADVICE (OUTPATIENT)
Dept: FAMILY MEDICINE | Facility: CLINIC | Age: 25
End: 2020-12-08

## 2020-12-08 ENCOUNTER — VIRTUAL VISIT (OUTPATIENT)
Dept: FAMILY MEDICINE | Facility: CLINIC | Age: 25
End: 2020-12-08
Payer: COMMERCIAL

## 2020-12-08 DIAGNOSIS — J02.9 SORE THROAT: ICD-10-CM

## 2020-12-08 DIAGNOSIS — Z20.822 SUSPECTED COVID-19 VIRUS INFECTION: ICD-10-CM

## 2020-12-08 DIAGNOSIS — J02.9 SORE THROAT: Primary | ICD-10-CM

## 2020-12-08 LAB
DEPRECATED S PYO AG THROAT QL EIA: NEGATIVE
SPECIMEN SOURCE: NORMAL
SPECIMEN SOURCE: NORMAL
STREP GROUP A PCR: NOT DETECTED

## 2020-12-08 PROCEDURE — 87651 STREP A DNA AMP PROBE: CPT | Performed by: NURSE PRACTITIONER

## 2020-12-08 PROCEDURE — 99N1174 PR STATISTIC STREP A RAPID: Performed by: NURSE PRACTITIONER

## 2020-12-08 PROCEDURE — 99213 OFFICE O/P EST LOW 20 MIN: CPT | Mod: TEL | Performed by: NURSE PRACTITIONER

## 2020-12-08 PROCEDURE — U0003 INFECTIOUS AGENT DETECTION BY NUCLEIC ACID (DNA OR RNA); SEVERE ACUTE RESPIRATORY SYNDROME CORONAVIRUS 2 (SARS-COV-2) (CORONAVIRUS DISEASE [COVID-19]), AMPLIFIED PROBE TECHNIQUE, MAKING USE OF HIGH THROUGHPUT TECHNOLOGIES AS DESCRIBED BY CMS-2020-01-R: HCPCS | Performed by: FAMILY MEDICINE

## 2020-12-08 ASSESSMENT — ENCOUNTER SYMPTOMS
CARDIOVASCULAR NEGATIVE: 1
RESPIRATORY NEGATIVE: 1
HEADACHES: 1
MYALGIAS: 0
SORE THROAT: 1
GASTROINTESTINAL NEGATIVE: 1
CONSTITUTIONAL NEGATIVE: 1

## 2020-12-08 NOTE — PROGRESS NOTES
"Amy Jauregui is a 24 year old female who is being evaluated via a billable telephone visit.      The patient has been notified of following:     \"This telephone visit will be conducted via a call between you and your physician/provider. We have found that certain health care needs can be provided without the need for a physical exam.  This service lets us provide the care you need with a short phone conversation.  If a prescription is necessary we can send it directly to your pharmacy.  If lab work is needed we can place an order for that and you can then stop by our lab to have the test done at a later time.    Telephone visits are billed at different rates depending on your insurance coverage. During this emergency period, for some insurers they may be billed the same as an in-person visit.  Please reach out to your insurance provider with any questions.    If during the course of the call the physician/provider feels a telephone visit is not appropriate, you will not be charged for this service.\"    Patient has given verbal consent for Telephone visit?  Yes    What phone number would you like to be contacted at? [phone]    How would you like to obtain your AVS? Maribelhart    Subjective     Amy Jauregui is a 24 year old female who presents via phone visit today for the following health issues:    HPI        On 12/6/20 - E-visit with   For Cov-19 concerns - Cov 19 test ordered and schedule for 12/8/20 (today)      Concern for COVID-19  About how many days ago did these symptoms start? 12/6/20  Is this your first visit for this illness? No   How would you describe your symptoms since your last visit? My symptoms have stayed the same (Sore Throat)  In the 14 days before your symptoms started, have you had close contact with someone with COVID-19 (Coronavirus)? No  Do you have a fever or chills? No  Are you having new or worsening difficulty breathing? No  Do you have new or worsening cough? No  Have you " had any new or unexplained body aches? No    Have you experienced any of the following NEW symptoms?    Headache: YES    Sore throat: YES    Loss of taste or smell: No    Chest pain: No    Diarrhea: No    Rash: No  What treatments have you tried? no  Who do you live with? 1 roommate  Are you, or a household member, a healthcare worker or a ? No  Do you live in a nursing home, group home, or shelter? No  Do you have a way to get food/medications if quarantined? Yes, I have a friend or family member who can help me.    Additional provider notes: Has sore throat with white patches. Has COVID test schedule for today. Would like to add on strep test given her symptoms. No known exposures.     Review of Systems   Constitutional: Negative.    HENT: Positive for sore throat.    Respiratory: Negative.    Cardiovascular: Negative.    Gastrointestinal: Negative.    Musculoskeletal: Negative for myalgias.   Skin: Negative for rash.   Neurological: Positive for headaches.             Objective          Vitals:  No vitals were obtained today due to virtual visit.    healthy, alert, no distress and cooperative  PSYCH: Alert and oriented times 3; coherent speech, normal   rate and volume, able to articulate logical thoughts, able   to abstract reason, no tangential thoughts, no hallucinations   or delusions  Her affect is normal and pleasant  RESP: No cough, no audible wheezing, able to talk in full sentences  Remainder of exam unable to be completed due to telephone visits            Assessment/Plan:    Assessment & Plan     Sore throat  [place]OVID [place] [facility] Patient would also like strep testing done as she only has sore throat and HA and has a history of strep as well as white patches on her tonsils. Strep test ordered and instructed patient to make use testers swab her for both since it is close to her test time.     - Streptococcus A Rapid Scr w Reflx to PCR; Future    "  BMI:   Estimated body mass index is 29.11 kg/m  as calculated from the following:    Height as of 10/23/20: 1.645 m (5' 4.75\").    Weight as of 10/23/20: 78.7 kg (173 lb 9.6 oz).            Patient Instructions   Strep test added for testing today. Make sure they do both swabs when you go to your drive up appointment. We will contact you with those results.         Return if symptoms worsen or fail to improve.    HILDA Nieves [facility]    Phone call duration:  3 minutes                "

## 2020-12-08 NOTE — PATIENT INSTRUCTIONS
Strep test added for testing today. Make sure they do both swabs when you go to your drive up appointment. We will contact you with those results.

## 2020-12-09 LAB
SARS-COV-2 RNA SPEC QL NAA+PROBE: NOT DETECTED
SPECIMEN SOURCE: NORMAL

## 2020-12-10 ENCOUNTER — OFFICE VISIT (OUTPATIENT)
Dept: FAMILY MEDICINE | Facility: CLINIC | Age: 25
End: 2020-12-10
Payer: COMMERCIAL

## 2020-12-10 VITALS
SYSTOLIC BLOOD PRESSURE: 110 MMHG | DIASTOLIC BLOOD PRESSURE: 76 MMHG | BODY MASS INDEX: 29.01 KG/M2 | HEART RATE: 60 BPM | WEIGHT: 173 LBS

## 2020-12-10 DIAGNOSIS — F33.0 MILD EPISODE OF RECURRENT MAJOR DEPRESSIVE DISORDER (H): ICD-10-CM

## 2020-12-10 DIAGNOSIS — J02.9 EXUDATIVE PHARYNGITIS: Primary | ICD-10-CM

## 2020-12-10 PROCEDURE — 99214 OFFICE O/P EST MOD 30 MIN: CPT | Performed by: NURSE PRACTITIONER

## 2020-12-10 RX ORDER — CEPHALEXIN 500 MG/1
500 CAPSULE ORAL 2 TIMES DAILY
Qty: 20 CAPSULE | Refills: 0 | Status: SHIPPED | OUTPATIENT
Start: 2020-12-10 | End: 2021-09-15

## 2020-12-10 ASSESSMENT — PATIENT HEALTH QUESTIONNAIRE - PHQ9: SUM OF ALL RESPONSES TO PHQ QUESTIONS 1-9: 6

## 2021-01-09 NOTE — PROGRESS NOTES
Subjective     Amy Jauregui is a 24 year old female who presents to clinic today for the following health issues:    HPI         Acute Illness/RECHECK  Acute illness concerns: throat pain, white spots, redness   Onset/Duration: x 5 days  Symptoms:  Fever: no  Chills/Sweats: no  Headache (location?): YES  Sinus Pressure: no  Conjunctivitis:  no  Ear Pain: itchy  Rhinorrhea: no  Congestion: no  Sore Throat: YES  Cough: yes at night when laying down  Wheeze: no  Decreased Appetite: no  Nausea: no  Vomiting: no  Diarrhea: no  Dysuria/Freq.: no  Dysuria or Hematuria: no  Fatigue/Achiness: no  Sick/Strep Exposure: no  Therapies tried and outcome: gargling salt water, chloro septic spray, cough drops, tea    She was tested for strep and COVID on 12/8 and was negative. She reports that she has had exudate for the last 3 days. It has been very painful and bothersome. Symptoms have manageable but uncomfortable. She has been gargling saltwater and drinking hot tea seems to be helpful. No pain with talking in general but feels more irritated throughout the day. She has been able to eat and drink.        Depression Followup    How are you doing with your depression since your last visit? Improved     Are you having other symptoms that might be associated with depression? No    Have you had a significant life event?  No     Are you feeling anxious or having panic attacks?   No    Do you have any concerns with your use of alcohol or other drugs? No   She has a history of depression. She has been on sertaline in the past but is no longer taking this. She feels that she is managing this pretty well. Not interest in starting medication. She will be starting school for her masters on 1/11 at [employer]. She continues to work full time.     Social History     Tobacco Use     Smoking status: Never Smoker     Smokeless tobacco: Never Used   Substance Use Topics     Alcohol use: Yes     Comment: occ     Drug use: No     PHQ 10/11/2019  10/23/2020 12/10/2020   PHQ-9 Total Score 4 9 6   Q9: Thoughts of better off dead/self-harm past 2 weeks Not at all Not at all Not at all     GURU-7 SCORE 3/8/2018 2/7/2019 10/23/2020   Total Score - - -   Total Score 5 12 8     Last PHQ-9 12/10/2020   1.  Little interest or pleasure in doing things 0   2.  Feeling down, depressed, or hopeless 0   3.  Trouble falling or staying asleep, or sleeping too much 3   4.  Feeling tired or having little energy 1   5.  Poor appetite or overeating 1   6.  Feeling bad about yourself 0   7.  Trouble concentrating 1   8.  Moving slowly or restless 0   Q9: Thoughts of better off dead/self-harm past 2 weeks 0   PHQ-9 Total Score 6   Difficulty at work, home, or with people Somewhat difficult         Suicide Assessment Five-step Evaluation and Treatment (SAFE-T)      How many servings of fruits and vegetables do you eat daily?  0-1    On average, how many sweetened beverages do you drink each day (Examples: soda, juice, sweet tea, etc.  Do NOT count diet or artificially sweetened beverages)?   1    How many days per week do you exercise enough to make your heart beat faster? 3 or less    How many minutes a day do you exercise enough to make your heart beat faster? 9 or less    How many days per week do you miss taking your medication? 0      Review of Systems   Constitutional, HEENT, cardiovascular, pulmonary, gi and gu systems are negative, except as otherwise noted. Positive for sore throat with exudate.       Objective    /76   Pulse 60   Wt 78.5 kg (173 lb)   BMI 29.01 kg/m    Body mass index is 29.01 kg/m .  Physical Exam     GENERAL: healthy, alert and no distress  EYES: Eyes grossly normal to inspection, PERRL and conjunctivae and sclerae normal  HENT: normal cephalic/atraumatic, ear canals and TM's normal, oral mucous membranes moist. Positive for tonsillar hypertrophy, tonsillar erythema and tonsillar exudate. Bilateral tonsillar exudate with white/ green present.  "Remainder of oropharynx clear.   NECK: slight cervical chain adenopathy present.  No asymmetry, masses, or scars and thyroid normal to palpation  RESP: lungs clear to auscultation - no rales, rhonchi or wheezes  CV: regular rate and rhythm, normal S1 S2, no S3 or S4, no murmur, click or rub, no peripheral edema and peripheral pulses strong  ABDOMEN: soft, nontender, no hepatosplenomegaly, no masses and bowel sounds normal  MS: no gross musculoskeletal defects noted, no edema            Assessment & Plan     Exudative pharyngitis  Exudative pharyngitis present.  Patient is an allergy to penicillins.  I recommended starting cephalexin 500 mg twice daily for 10 days to treat acute infection.  She has tested negative for COVID-19 as well as strep.  Based on symptom presentation would recommend antibiotic management.  Reviewed risks and benefits of antibiotic as well as potential side effects.  Patient is in agreement with this plan.  - cephALEXin (KEFLEX) 500 MG capsule  Dispense: 20 capsule; Refill: 0    Major depressive disorder, recurrent episode, mild (H)  Patient doing well with self-management of her major depression.  Encouraged her to continue with self care.  No medications prescribed today.  Advised patient to follow-up if symptoms of depression or anxiety worsen.  PHQ-9 score today is 6.        BMI:   Estimated body mass index is 29.01 kg/m  as calculated from the following:    Height as of 10/23/20: 1.645 m (5' 4.75\").    Weight as of this encounter: 78.5 kg (173 lb).   Weight management plan: Discussed healthy diet and exercise guidelines             Return in about 5 days (around 12/15/2020), or if symptoms worsen or fail to improve, for 6 months or sooner as needed. .    Muriel Cooper, HILDA CNP  [facility]    " Statement Selected

## 2021-09-10 ENCOUNTER — NURSE TRIAGE (OUTPATIENT)
Dept: NURSING | Facility: CLINIC | Age: 26
End: 2021-09-10

## 2021-09-10 NOTE — TELEPHONE ENCOUNTER
Pt states she has been off her sertraline x 2 years. She is seeing a counselor. Pt wants to go back on sertraline. Declined triage. Asks if she can just get a refill and restart med or does she need appt. Advised pt she will need appt to discuss med. She did e-visit online already and system directed her to call. She prefers virtual visit. Warm trans to scheduling to schedule virtual visit to discuss restarting med.     Reason for Disposition    Caller has medication question, adult has minor symptoms, caller declines triage, AND triager answers question    Additional Information    Negative: Drug overdose and triager unable to answer question    Negative: Caller requesting information unrelated to medicine    Negative: Caller requesting a prescription for Strep throat and has a positive culture result    Negative: Rash while taking a medication or within 3 days of stopping it    Negative: Immunization reaction suspected    Negative: [1] Asthma and [2] having symptoms of asthma (cough, wheezing, etc.)    Negative: [1] Influenza symptoms AND [2] anti-viral med prescription request, such as Tamiflu    Negative: [1] Symptom of illness (e.g., headache, abdominal pain, earache, vomiting) AND [2] more than mild    Negative: MORE THAN A DOUBLE DOSE of a prescription or over-the-counter (OTC) drug    Negative: [1] DOUBLE DOSE (an extra dose or lesser amount) of over-the-counter (OTC) drug AND [2] any symptoms (e.g., dizziness, nausea, pain, sleepiness)    Negative: [1] DOUBLE DOSE (an extra dose or lesser amount) of prescription drug AND [2] any symptoms (e.g., dizziness, nausea, pain, sleepiness)    Negative: Took another person's prescription drug    Negative: [1] DOUBLE DOSE (an extra dose or lesser amount) of prescription drug AND [2] NO symptoms (Exception: a double dose of antibiotics)    Negative: Diabetes drug error or overdose (e.g., took wrong type of insulin or took extra dose)    Negative: [1] Request for URGENT  "new prescription or refill of \"essential\" medication (i.e., likelihood of harm to patient if not taken) AND [2] triager unable to fill per unit policy    Negative: [1] Prescription not at pharmacy AND [2] was prescribed by PCP recently    Negative: [1] Pharmacy calling with prescription questions AND [2] triager unable to answer question    Negative: [1] Caller has URGENT medication question about med that PCP or specialist prescribed AND [2] triager unable to answer question    Negative: [1] Caller has NON-URGENT medication question about med that PCP prescribed AND [2] triager unable to answer question    Negative: [1] Caller requesting a NON-URGENT new prescription or refill AND [2] triager unable to refill per unit policy    Negative: [1] Caller has medication question about med not prescribed by PCP AND [2] triager unable to answer question (e.g., compatibility with other med, storage)    Negative: Caller requesting a CONTROLLED substance prescription refill (e.g., narcotics, ADHD medicines)    Negative: Caller wants to use a complementary or alternative medicine    Negative: [1] Prescription prescribed recently is not at pharmacy AND [2] triager has access to patient's EMR AND [3] prescription is recorded in the EMR    Negative: [1] DOUBLE DOSE (an extra dose or lesser amount) of over-the-counter (OTC) drug AND [2] NO symptoms    Negative: [1] DOUBLE DOSE (an extra dose or lesser amount) of antibiotic drug AND [2] NO symptoms    Negative: Caller has medication question only, adult not sick, and triager answers question    Protocols used: MEDICATION QUESTION CALL-A-AH      "

## 2021-09-15 ENCOUNTER — VIRTUAL VISIT (OUTPATIENT)
Dept: FAMILY MEDICINE | Facility: CLINIC | Age: 26
End: 2021-09-15
Payer: COMMERCIAL

## 2021-09-15 DIAGNOSIS — F33.1 MODERATE EPISODE OF RECURRENT MAJOR DEPRESSIVE DISORDER (H): ICD-10-CM

## 2021-09-15 DIAGNOSIS — K21.9 GERD WITHOUT ESOPHAGITIS: Primary | ICD-10-CM

## 2021-09-15 PROCEDURE — 99214 OFFICE O/P EST MOD 30 MIN: CPT | Mod: TEL | Performed by: INTERNAL MEDICINE

## 2021-09-15 PROCEDURE — 96127 BRIEF EMOTIONAL/BEHAV ASSMT: CPT | Mod: 59 | Performed by: INTERNAL MEDICINE

## 2021-09-15 ASSESSMENT — ANXIETY QUESTIONNAIRES
7. FEELING AFRAID AS IF SOMETHING AWFUL MIGHT HAPPEN: SEVERAL DAYS
2. NOT BEING ABLE TO STOP OR CONTROL WORRYING: MORE THAN HALF THE DAYS
3. WORRYING TOO MUCH ABOUT DIFFERENT THINGS: MORE THAN HALF THE DAYS
6. BECOMING EASILY ANNOYED OR IRRITABLE: MORE THAN HALF THE DAYS
IF YOU CHECKED OFF ANY PROBLEMS ON THIS QUESTIONNAIRE, HOW DIFFICULT HAVE THESE PROBLEMS MADE IT FOR YOU TO DO YOUR WORK, TAKE CARE OF THINGS AT HOME, OR GET ALONG WITH OTHER PEOPLE: VERY DIFFICULT
1. FEELING NERVOUS, ANXIOUS, OR ON EDGE: NEARLY EVERY DAY
GAD7 TOTAL SCORE: 13
5. BEING SO RESTLESS THAT IT IS HARD TO SIT STILL: SEVERAL DAYS

## 2021-09-15 ASSESSMENT — PATIENT HEALTH QUESTIONNAIRE - PHQ9
5. POOR APPETITE OR OVEREATING: MORE THAN HALF THE DAYS
SUM OF ALL RESPONSES TO PHQ QUESTIONS 1-9: 16

## 2021-09-15 NOTE — PROGRESS NOTES
Amy is a 25 year old who is being evaluated via a billable telephone visit.      What phone number would you like to be contacted at? [phone]  How would you like to obtain your AVS? MyChart    Assessment & Plan     Moderate episode of recurrent major depressive disorder (H)    Worsened recently due to stressors and she'd like to resume medication.  She was previously on sertraline with benefit and no significant side effects, so we'll resume this as below.  She is following with a counselor.  We'll follow-up in 2 months.     - sertraline (ZOLOFT) 50 MG tablet; Take 25mg (1/2 tablet) daily for 1-2 weeks, then increase to 50mg (1 tab) daily    GERD without esophagitis    Worsened symptoms recently, taking PPI only intermittently.  Med refilled so she can take this more consistently.      - omeprazole (PRILOSEC) 20 MG DR capsule; Take 1 capsule (20 mg) by mouth daily      10 minutes spent on the date of the encounter doing chart review, history and exam, documentation and further activities per the note           Return in about 2 months (around 11/15/2021) for Follow up.    Darek Marte MD  [facility]   Amy is a 25 year old who presents for the following health issues     HPI      Would like a refill of sertraline and omperazole, been off for about 2 years. Been talking to a counselor and thinks it would benefit her to get back on sertraline.     Omeprazole - She has been on this intermittently recently, takes it when symptoms are worse, but symptoms have been more consistent, so she'd like to resume daily usage.  She sometimes take Tums but that doesn't seem to help.      Depression and Anxiety Follow-Up    How are you doing with your depression since your last visit? Worsened with grad school and covid.     How are you doing with your anxiety since your last visit?  Worsened with school     Are you having other symptoms that might be associated with depression  or anxiety? Yes:  hard time sleeping/ hard time turning thoughts off at night, feeling fatigued, loss of appetite     Have you had a significant life event? OTHER: schooling and covid      She has been working with a counselor since January and she recommended resuming medication.  She talks to her biweekly     She was previously on sertraline and this was helpful for her from what she recalls, but she isn't sure if she was on it long enough for it to be fully effective that last time she was on it.  When she was on it more consistently in high school it helped.  No significant side effects.        Social History     Tobacco Use     Smoking status: Never Smoker     Smokeless tobacco: Never Used   Vaping Use     Vaping Use: Never used   Substance Use Topics     Alcohol use: Yes     Comment: occ     Drug use: Yes     Types: Marijuana     Comment: once about every 6 months      PHQ 10/23/2020 12/10/2020 9/15/2021   PHQ-9 Total Score 9 6 16   Q9: Thoughts of better off dead/self-harm past 2 weeks Not at all Not at all Not at all     GURU-7 SCORE 2/7/2019 10/23/2020 9/15/2021   Total Score - - -   Total Score 12 8 13     Last PHQ-9 9/15/2021   1.  Little interest or pleasure in doing things 2   2.  Feeling down, depressed, or hopeless 2   3.  Trouble falling or staying asleep, or sleeping too much 3   4.  Feeling tired or having little energy 3   5.  Poor appetite or overeating 3   6.  Feeling bad about yourself 1   7.  Trouble concentrating 1   8.  Moving slowly or restless 1   Q9: Thoughts of better off dead/self-harm past 2 weeks 0   PHQ-9 Total Score 16   Difficulty at work, home, or with people Very difficult     GURU-7  9/15/2021   1. Feeling nervous, anxious, or on edge 3   2. Not being able to stop or control worrying 2   3. Worrying too much about different things 2   4. Trouble relaxing 2   5. Being so restless that it is hard to sit still 1   6. Becoming easily annoyed or irritable 2   7. Feeling afraid, as if  something awful might happen 1   GURU-7 Total Score 13   If you checked any problems, how difficult have they made it for you to do your work, take care of things at home, or get along with other people? Very difficult       Suicide Assessment Five-step Evaluation and Treatment (SAFE-T)      Review of Systems   Constitutional, GI, psych systems are negative, except as otherwise noted.      Objective           Vitals:  No vitals were obtained today due to virtual visit.    Physical Exam   healthy, alert and no distress  PSYCH: Alert and oriented times 3; coherent speech, normal   rate and volume, able to articulate logical thoughts, able   to abstract reason, no tangential thoughts, no hallucinations   or delusions  Her affect is normal  RESP: No cough, no audible wheezing, able to talk in full sentences  Remainder of exam unable to be completed due to telephone visits          Phone call duration: 6 minutes

## 2021-09-16 ASSESSMENT — ANXIETY QUESTIONNAIRES: GAD7 TOTAL SCORE: 13

## 2021-09-18 ENCOUNTER — HEALTH MAINTENANCE LETTER (OUTPATIENT)
Age: 26
End: 2021-09-18

## 2021-10-20 ENCOUNTER — MYC MEDICAL ADVICE (OUTPATIENT)
Dept: FAMILY MEDICINE | Facility: CLINIC | Age: 26
End: 2021-10-20

## 2021-10-20 DIAGNOSIS — F33.1 MODERATE EPISODE OF RECURRENT MAJOR DEPRESSIVE DISORDER (H): ICD-10-CM

## 2021-10-20 ASSESSMENT — PATIENT HEALTH QUESTIONNAIRE - PHQ9
10. IF YOU CHECKED OFF ANY PROBLEMS, HOW DIFFICULT HAVE THESE PROBLEMS MADE IT FOR YOU TO DO YOUR WORK, TAKE CARE OF THINGS AT HOME, OR GET ALONG WITH OTHER PEOPLE: SOMEWHAT DIFFICULT
SUM OF ALL RESPONSES TO PHQ QUESTIONS 1-9: 8
SUM OF ALL RESPONSES TO PHQ QUESTIONS 1-9: 8

## 2021-10-21 ASSESSMENT — PATIENT HEALTH QUESTIONNAIRE - PHQ9: SUM OF ALL RESPONSES TO PHQ QUESTIONS 1-9: 8

## 2022-01-08 ENCOUNTER — HEALTH MAINTENANCE LETTER (OUTPATIENT)
Age: 27
End: 2022-01-08

## 2022-03-10 ENCOUNTER — VIRTUAL VISIT (OUTPATIENT)
Dept: FAMILY MEDICINE | Facility: CLINIC | Age: 27
End: 2022-03-10
Payer: COMMERCIAL

## 2022-03-10 ENCOUNTER — MYC MEDICAL ADVICE (OUTPATIENT)
Dept: FAMILY MEDICINE | Facility: CLINIC | Age: 27
End: 2022-03-10

## 2022-03-10 ENCOUNTER — MYC MEDICAL ADVICE (OUTPATIENT)
Dept: FAMILY MEDICINE | Facility: CLINIC | Age: 27
End: 2022-03-10
Payer: COMMERCIAL

## 2022-03-10 DIAGNOSIS — J02.9 EXUDATIVE PHARYNGITIS: ICD-10-CM

## 2022-03-10 PROCEDURE — 99213 OFFICE O/P EST LOW 20 MIN: CPT | Mod: TEL | Performed by: INTERNAL MEDICINE

## 2022-03-10 RX ORDER — CEPHALEXIN 500 MG/1
500 CAPSULE ORAL 2 TIMES DAILY
Qty: 20 CAPSULE | Refills: 0 | Status: SHIPPED | OUTPATIENT
Start: 2022-03-10 | End: 2022-05-25

## 2022-03-10 NOTE — PROGRESS NOTES
Amy is a 26 year old who is being evaluated via a billable telephone visit.      What phone number would you like to be contacted at? [phone]   How would you like to obtain your AVS? MyChart    Assessment & Plan     Amy was seen today for pharyngitis.    Diagnoses and all orders for this visit:    Exudative pharyngitis  -     cephALEXin (KEFLEX) 500 MG capsule; Take 1 capsule (500 mg) by mouth 2 times daily  -     CBC with platelets and differential; Future  -     Mononucleosis screen; Future       Empirically treat for bacterial pharyngitis. If no improvement, get labs to rule out mono.     Return in about 2 days (around 3/12/2022) for Lab appointment if no improvement in 48 hours of antibiotic. .    Ugo Lees MD PhD  [facility]   Amy is a 26 year old who presents for the following health issues  accompanied by her .    History of Present Illness       Reason for visit:  Sore throat  Symptom onset:  1-3 days ago  Symptoms include:  Sore throat, cough, headaches, congestion, fatigue, body aches  Symptom intensity:  Moderate  Symptom progression:  Worsening  Had these symptoms before:  Yes  Has tried/received treatment for these symptoms:  Yes  Previous treatment was successful:  Yes  Prior treatment description:  Ceflalexin  What makes it worse:  Swallowing  What makes it better:  Chloraseptic    She eats 0-1 servings of fruits and vegetables daily.She consumes 2 sweetened beverage(s) daily.She exercises with enough effort to increase her heart rate 30 to 60 minutes per day.  She exercises with enough effort to increase her heart rate 3 or less days per week.   She is taking medications regularly.       Acute Illness  Acute illness concerns: Sore throat, cough  Onset/Duration: x 3 days  Symptoms:  Fever: no  Chills/Sweats: YES  Headache (location?): YES  Sinus Pressure: YES  Conjunctivitis:  no  Ear Pain: no  Rhinorrhea: no  Congestion: YES  Sore Throat:  YES  Cough: YES-non-productive  Wheeze: no  Decreased Appetite: YES  Nausea: no  Vomiting: no  Diarrhea: no  Dysuria/Freq.: no  Dysuria or Hematuria: no  Fatigue/Achiness: YES  Sick/Strep Exposure: no  Therapies tried and outcome: OTC ibu, chloraseptic spray, salt water, cough drops    At home covid test negative. Prone to strep.   Lymph nodes swollen. Very painful.   Pt sent me pictures of the throat, first one from last December and second one from today.   This pain felt similar. Last time, strep covid negative. Keflex took the pain away in 24 hours.       Review of Systems   Constitutional, HEENT, cardiovascular, pulmonary, gi and gu systems are negative, except as otherwise noted.      Objective           Vitals:  No vitals were obtained today due to virtual visit.    Physical Exam   fatigued  Pictures she sent are consistent with exudative pharyngitis.             Phone call duration: 16 minutes

## 2022-03-10 NOTE — TELEPHONE ENCOUNTER
Please see medical message.   Messaged patient that medication cannot be prescribed without a visit.  Please see attached picture.    Mayi Reid RN on 3/10/2022 at 11:03 AM

## 2022-04-12 ENCOUNTER — MYC MEDICAL ADVICE (OUTPATIENT)
Dept: FAMILY MEDICINE | Facility: CLINIC | Age: 27
End: 2022-04-12
Payer: COMMERCIAL

## 2022-04-12 NOTE — TELEPHONE ENCOUNTER
Patient Quality Outreach    Patient is due for the following:   Asthma  -  ACT needed  Physical  - Due after 4/12/2022    NEXT STEPS:   Schedule a yearly physical    Type of outreach:    Phone, spoke to patient/parent. patient and Sent [employer] message.    Next Steps:  Reach out within 90 days via [employer].    Max number of attempts reached: Yes. Will try again in 90 days if patient still on fail list.    Questions for provider review:    None     Flora Morataya

## 2022-05-25 ENCOUNTER — OFFICE VISIT (OUTPATIENT)
Dept: FAMILY MEDICINE | Facility: CLINIC | Age: 27
End: 2022-05-25
Payer: COMMERCIAL

## 2022-05-25 VITALS
HEART RATE: 87 BPM | OXYGEN SATURATION: 94 % | HEIGHT: 65 IN | TEMPERATURE: 97.7 F | RESPIRATION RATE: 16 BRPM | WEIGHT: 204 LBS | SYSTOLIC BLOOD PRESSURE: 118 MMHG | BODY MASS INDEX: 33.99 KG/M2 | DIASTOLIC BLOOD PRESSURE: 84 MMHG

## 2022-05-25 DIAGNOSIS — Z00.00 WELL ADULT EXAM: Primary | ICD-10-CM

## 2022-05-25 DIAGNOSIS — J35.8 CRYPTIC TONSIL: ICD-10-CM

## 2022-05-25 DIAGNOSIS — Z11.3 SCREEN FOR STD (SEXUALLY TRANSMITTED DISEASE): ICD-10-CM

## 2022-05-25 DIAGNOSIS — Z12.4 CERVICAL CANCER SCREENING: ICD-10-CM

## 2022-05-25 DIAGNOSIS — F33.1 MODERATE EPISODE OF RECURRENT MAJOR DEPRESSIVE DISORDER (H): ICD-10-CM

## 2022-05-25 DIAGNOSIS — K21.9 GERD WITHOUT ESOPHAGITIS: ICD-10-CM

## 2022-05-25 DIAGNOSIS — R63.5 WEIGHT GAIN: ICD-10-CM

## 2022-05-25 DIAGNOSIS — N39.0 RECURRENT UTI: ICD-10-CM

## 2022-05-25 LAB
ANION GAP SERPL CALCULATED.3IONS-SCNC: 7 MMOL/L (ref 3–14)
BUN SERPL-MCNC: 9 MG/DL (ref 7–30)
CALCIUM SERPL-MCNC: 8.8 MG/DL (ref 8.5–10.1)
CHLORIDE BLD-SCNC: 109 MMOL/L (ref 94–109)
CHOLEST SERPL-MCNC: 165 MG/DL
CO2 SERPL-SCNC: 25 MMOL/L (ref 20–32)
CREAT SERPL-MCNC: 0.61 MG/DL (ref 0.52–1.04)
FASTING STATUS PATIENT QL REPORTED: NO
GFR SERPL CREATININE-BSD FRML MDRD: >90 ML/MIN/1.73M2
GLUCOSE BLD-MCNC: 101 MG/DL (ref 70–99)
HDLC SERPL-MCNC: 47 MG/DL
LDLC SERPL CALC-MCNC: 89 MG/DL
NONHDLC SERPL-MCNC: 118 MG/DL
POTASSIUM BLD-SCNC: 4 MMOL/L (ref 3.4–5.3)
SODIUM SERPL-SCNC: 141 MMOL/L (ref 133–144)
T PALLIDUM AB SER QL: NONREACTIVE
TRIGL SERPL-MCNC: 147 MG/DL
TSH SERPL DL<=0.005 MIU/L-ACNC: 3.37 MU/L (ref 0.4–4)

## 2022-05-25 PROCEDURE — 36415 COLL VENOUS BLD VENIPUNCTURE: CPT | Performed by: FAMILY MEDICINE

## 2022-05-25 PROCEDURE — 87591 N.GONORRHOEAE DNA AMP PROB: CPT | Performed by: FAMILY MEDICINE

## 2022-05-25 PROCEDURE — G0145 SCR C/V CYTO,THINLAYER,RESCR: HCPCS | Performed by: FAMILY MEDICINE

## 2022-05-25 PROCEDURE — 99214 OFFICE O/P EST MOD 30 MIN: CPT | Mod: 25 | Performed by: FAMILY MEDICINE

## 2022-05-25 PROCEDURE — 99395 PREV VISIT EST AGE 18-39: CPT | Performed by: FAMILY MEDICINE

## 2022-05-25 PROCEDURE — 87389 HIV-1 AG W/HIV-1&-2 AB AG IA: CPT | Performed by: FAMILY MEDICINE

## 2022-05-25 PROCEDURE — 86780 TREPONEMA PALLIDUM: CPT | Performed by: FAMILY MEDICINE

## 2022-05-25 PROCEDURE — 80061 LIPID PANEL: CPT | Performed by: FAMILY MEDICINE

## 2022-05-25 PROCEDURE — 84443 ASSAY THYROID STIM HORMONE: CPT | Performed by: FAMILY MEDICINE

## 2022-05-25 PROCEDURE — 86803 HEPATITIS C AB TEST: CPT | Performed by: FAMILY MEDICINE

## 2022-05-25 PROCEDURE — 87491 CHLMYD TRACH DNA AMP PROBE: CPT | Performed by: FAMILY MEDICINE

## 2022-05-25 PROCEDURE — 80048 BASIC METABOLIC PNL TOTAL CA: CPT | Performed by: FAMILY MEDICINE

## 2022-05-25 RX ORDER — CEPHALEXIN 500 MG/1
500 CAPSULE ORAL 2 TIMES DAILY
Qty: 20 CAPSULE | Refills: 0 | COMMUNITY
Start: 2022-05-25

## 2022-05-25 RX ORDER — SERTRALINE HYDROCHLORIDE 100 MG/1
100 TABLET, FILM COATED ORAL DAILY
Qty: 90 TABLET | Refills: 4 | Status: SHIPPED | OUTPATIENT
Start: 2022-05-25 | End: 2022-11-30

## 2022-05-25 ASSESSMENT — ENCOUNTER SYMPTOMS
ARTHRALGIAS: 0
CHILLS: 0
COUGH: 0
HEMATURIA: 0
ABDOMINAL PAIN: 0
DIZZINESS: 0
DYSURIA: 0
FREQUENCY: 0
NERVOUS/ANXIOUS: 1
DIARRHEA: 0
JOINT SWELLING: 0
PALPITATIONS: 0
HEADACHES: 1
PARESTHESIAS: 0
NAUSEA: 0
HEARTBURN: 1
FEVER: 0
MYALGIAS: 0
SHORTNESS OF BREATH: 1
WEAKNESS: 1
CONSTIPATION: 1
SORE THROAT: 0
HEMATOCHEZIA: 0
EYE PAIN: 0

## 2022-05-25 ASSESSMENT — ASTHMA QUESTIONNAIRES
QUESTION_3 LAST FOUR WEEKS HOW OFTEN DID YOUR ASTHMA SYMPTOMS (WHEEZING, COUGHING, SHORTNESS OF BREATH, CHEST TIGHTNESS OR PAIN) WAKE YOU UP AT NIGHT OR EARLIER THAN USUAL IN THE MORNING: NOT AT ALL
QUESTION_5 LAST FOUR WEEKS HOW WOULD YOU RATE YOUR ASTHMA CONTROL: WELL CONTROLLED
ACT_TOTALSCORE: 22
QUESTION_1 LAST FOUR WEEKS HOW MUCH OF THE TIME DID YOUR ASTHMA KEEP YOU FROM GETTING AS MUCH DONE AT WORK, SCHOOL OR AT HOME: NONE OF THE TIME
QUESTION_4 LAST FOUR WEEKS HOW OFTEN HAVE YOU USED YOUR RESCUE INHALER OR NEBULIZER MEDICATION (SUCH AS ALBUTEROL): NOT AT ALL
QUESTION_2 LAST FOUR WEEKS HOW OFTEN HAVE YOU HAD SHORTNESS OF BREATH: THREE TO SIX TIMES A WEEK
ACT_TOTALSCORE: 22

## 2022-05-25 ASSESSMENT — PAIN SCALES - GENERAL: PAINLEVEL: NO PAIN (0)

## 2022-05-25 ASSESSMENT — PATIENT HEALTH QUESTIONNAIRE - PHQ9
SUM OF ALL RESPONSES TO PHQ QUESTIONS 1-9: 11
SUM OF ALL RESPONSES TO PHQ QUESTIONS 1-9: 11
10. IF YOU CHECKED OFF ANY PROBLEMS, HOW DIFFICULT HAVE THESE PROBLEMS MADE IT FOR YOU TO DO YOUR WORK, TAKE CARE OF THINGS AT HOME, OR GET ALONG WITH OTHER PEOPLE: SOMEWHAT DIFFICULT

## 2022-05-25 NOTE — LETTER
My Asthma Action Plan    Name: Amy Jauregui   YOB: 1995  Date: 5/25/2022   My doctor: Cirilo Delong MD   My clinic: [facility]        My Rescue Medicine:   Albuterol inhaler (Proair/Ventolin/Proventil HFA)  2-4 puffs EVERY 4 HOURS as needed. Use a spacer if recommended by your provider.   My Asthma Severity:   Intermittent / Exercise Induced  Know your asthma triggers:   exercise or sports          GREEN ZONE   Good Control    I feel good    No cough or wheeze    Can work, sleep and play without asthma symptoms       Take your asthma control medicine every day.     1. If exercise triggers your asthma, take your rescue medication    15 minutes before exercise or sports, and    During exercise if you have asthma symptoms  2. Spacer to use with inhaler: If you have a spacer, make sure to use it with your inhaler             YELLOW ZONE Getting Worse  I have ANY of these:    I do not feel good    Cough or wheeze    Chest feels tight    Wake up at night   1. Keep taking your Green Zone medications  2. Start taking your rescue medicine:    every 20 minutes for up to 1 hour. Then every 4 hours for 24-48 hours.  3. If you stay in the Yellow Zone for more than 12-24 hours, contact your doctor.  4. If you do not return to the Green Zone in 12-24 hours or you get worse, start taking your oral steroid medicine if prescribed by your provider.           RED ZONE Medical Alert - Get Help  I have ANY of these:    I feel awful    Medicine is not helping    Breathing getting harder    Trouble walking or talking    Nose opens wide to breathe       1. Take your rescue medicine NOW  2. If your provider has prescribed an oral steroid medicine, start taking it NOW  3. Call your doctor NOW  4. If you are still in the Red Zone after 20 minutes and you have not reached your doctor:    Take your rescue medicine again and    Call 911 or go to the emergency room right away    See your  regular doctor within 2 weeks of an Emergency Room or Urgent Care visit for follow-up treatment.          Annual Reminders:  Meet with Asthma Educator,  Flu Shot in the Fall, consider Pneumonia Vaccination for patients with asthma (aged 19 and older).    Pharmacy:    [facility] - [place], [place] - [address] [facility] [address] [facility] - [place], [place] - [address] [facility] - [place], [place] - [address] [facility] [facility] - [place], [place] - [address]  [id number] - [place], [place] - [address] AT [facility] [address]    Electronically signed by Cirilo Delong MD   Date: 05/25/22                    Asthma Triggers  How To Control Things That Make Your Asthma Worse    Triggers are things that make your asthma worse.  Look at the list below to help you find your triggers and   what you can do about them. You can help prevent asthma flare-ups by staying away from your triggers.      Trigger                                                          What you can do   Cigarette Smoke  Tobacco smoke can make asthma worse. Do not allow smoking in your home, car or around you.  Be sure no one smokes at a child s day care or school.  If you smoke, ask your health care provider for ways to help you quit.  Ask family members to quit too.  Ask your health care provider for a referral to Quit Plan to help you quit smoking, or call 2-232-376-PLAN.     Colds, Flu, Bronchitis  These are common triggers of asthma. Wash your hands often.  Don t touch your eyes, nose or mouth.  Get a flu shot every year.     Dust Mites  These are tiny bugs that live in cloth or carpet. They are too small to see. Wash sheets and blankets in hot water every week.   Encase pillows and mattress in dust mite proof covers.  Avoid having carpet if you can. If you have carpet, vacuum weekly.   Use a dust mask and HEPA vacuum.   Pollen and Outdoor Mold  Some people are  allergic to trees, grass, or weed pollen, or molds. Try to keep your windows closed.  Limit time out doors when pollen count is high.   Ask you health care provider about taking medicine during allergy season.     Animal Dander  Some people are allergic to skin flakes, urine or saliva from pets with fur or feathers. Keep pets with fur or feathers out of your home.    If you can t keep the pet outdoors, then keep the pet out of your bedroom.  Keep the bedroom door closed.  Keep pets off cloth furniture and away from stuffed toys.     Mice, Rats, and Cockroaches  Some people are allergic to the waste from these pests.   Cover food and garbage.  Clean up spills and food crumbs.  Store grease in the refrigerator.   Keep food out of the bedroom.   Indoor Mold  This can be a trigger if your home has high moisture. Fix leaking faucets, pipes, or other sources of water.   Clean moldy surfaces.  Dehumidify basement if it is damp and smelly.   Smoke, Strong Odors, and Sprays  These can reduce air quality. Stay away from strong odors and sprays, such as perfume, powder, hair spray, paints, smoke incense, paint, cleaning products, candles and new carpet.   Exercise or Sports  Some people with asthma have this trigger. Be active!  Ask your doctor about taking medicine before sports or exercise to prevent symptoms.    Warm up for 5-10 minutes before and after sports or exercise.     Other Triggers of Asthma  Cold air:  Cover your nose and mouth with a scarf.  Sometimes laughing or crying can be a trigger.  Some medicines and food can trigger asthma.
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
## 2022-05-26 LAB
C TRACH DNA SPEC QL NAA+PROBE: NEGATIVE
HCV AB SERPL QL IA: NONREACTIVE
HIV 1+2 AB+HIV1 P24 AG SERPL QL IA: NONREACTIVE
N GONORRHOEA DNA SPEC QL NAA+PROBE: NEGATIVE

## 2022-05-27 LAB
BKR LAB AP GYN ADEQUACY: NORMAL
BKR LAB AP GYN INTERPRETATION: NORMAL
BKR LAB AP HPV REFLEX: NORMAL
BKR LAB AP PREVIOUS ABNORMAL: NORMAL
PATH REPORT.COMMENTS IMP SPEC: NORMAL
PATH REPORT.COMMENTS IMP SPEC: NORMAL
PATH REPORT.RELEVANT HX SPEC: NORMAL

## 2022-10-27 ENCOUNTER — TELEPHONE (OUTPATIENT)
Dept: FAMILY MEDICINE | Facility: CLINIC | Age: 27
End: 2022-10-27

## 2022-10-27 NOTE — TELEPHONE ENCOUNTER
Patient Quality Outreach    Patient is due for the following:   Depression  -  PHQ-9 needed    Next Steps:   Patient was assigned appropriate questionnaire to complete    Type of outreach:    Sent [employer] message.      Questions for provider review:    None     Zohra Lucas

## 2022-11-16 ENCOUNTER — MYC MEDICAL ADVICE (OUTPATIENT)
Dept: FAMILY MEDICINE | Facility: CLINIC | Age: 27
End: 2022-11-16

## 2022-11-16 DIAGNOSIS — F33.1 MODERATE EPISODE OF RECURRENT MAJOR DEPRESSIVE DISORDER (H): ICD-10-CM

## 2022-11-16 RX ORDER — SERTRALINE HYDROCHLORIDE 100 MG/1
100 TABLET, FILM COATED ORAL DAILY
Qty: 90 TABLET | Refills: 4 | OUTPATIENT
Start: 2022-11-16

## 2022-11-16 NOTE — TELEPHONE ENCOUNTER
MyChart message requesting refill of sertraline.  Patient has requested medications be sent to [place].  Patient has no filled out PHQ9 in related MyChart encounter.    Routed to Dr Delong  Can patient have medication as pended?    Alessandro HERNÁNDEZ [facility]

## 2022-11-16 NOTE — TELEPHONE ENCOUNTER
"See my chart message    Routing refill request to provider for review/approval because:  PHQ-9 needed.  Questionnaire sent to patient for completion  My chart message sent to notify and determine pharmacy        Pending Prescriptions:                       Disp   Refills    sertraline (ZOLOFT) 100 MG tablet         90 tab*4            Sig: Take 1 tablet (100 mg) by mouth daily      Requested Prescriptions   Pending Prescriptions Disp Refills     sertraline (ZOLOFT) 100 MG tablet 90 tablet 4     Sig: Take 1 tablet (100 mg) by mouth daily       SSRIs Protocol Failed - 11/16/2022 11:24 AM        Failed - PHQ-9 score less than 5 in past 6 months     Please review last PHQ-9 score.           Passed - Medication is active on med list        Passed - Patient is age 18 or older        Passed - No active pregnancy on record        Passed - No positive pregnancy test in last 12 months        Passed - Recent (6 mo) or future (30 days) visit within the authorizing provider's specialty     Patient had office visit in the last 6 months or has a visit in the next 30 days with authorizing provider or within the authorizing provider's specialty.  See \"Patient Info\" tab in inbasket, or \"Choose Columns\" in Meds & Orders section of the refill encounter.               Mayi Reid RN on 11/16/2022 at 11:27 AM    "

## 2022-11-19 ENCOUNTER — HEALTH MAINTENANCE LETTER (OUTPATIENT)
Age: 27
End: 2022-11-19

## 2022-11-30 ENCOUNTER — MYC MEDICAL ADVICE (OUTPATIENT)
Dept: FAMILY MEDICINE | Facility: CLINIC | Age: 27
End: 2022-11-30

## 2022-11-30 DIAGNOSIS — F33.1 MODERATE EPISODE OF RECURRENT MAJOR DEPRESSIVE DISORDER (H): ICD-10-CM

## 2023-04-13 NOTE — PATIENT INSTRUCTIONS
Neck , no lymphadenopathy Sleep psychologist: Dr. Robert Mcintyre is at the following clinics on the following days:  [facility]ARRISON - [address] [facility], [place], [place]        Thursday and Friday (PLEASE CALL [phone] to schedule an appointment).  THEE VALDES) [place] [address] [place]       Wednesdays   (PLEASE CALL [phone] to schedule an appointment).    Take vitamin D3 2,000 IU's daily in the winter  Follow up with Jen Rebolledo for counseling    Give up alcohol, caffeine, chocolate, peppermint and spicy foods for 2 weeks straight  DGL daily or pepto bismul for 2 weeks   Then if you still have symptoms 2 weeks of zantac 150 mg twice   If still with issues, prilosec 20 mg once daily for 2 weeks    Exercise regularly  5-7 servings of fruits and veggies, ok to add metamucil daily if unable to   64 ounces of water daily  Return to clinic for any new or worsening symptoms or go to ER Urgent care in off hours        Preventive Health Recommendations  Female Ages 18 to 25     Yearly exam:     See your health care provider every year in order to  o Review health changes.   o Discuss preventive care.    o Review your medicines if your doctor has prescribed any.      You should be tested each year for STDs (sexually transmitted diseases).       After age 20, talk to your provider about how often you should have cholesterol testing.      Starting at age 21, get a Pap test every three years. If you have an abnormal result, your doctor may have you test more often.      If you are at risk for diabetes, you should have a diabetes test (fasting glucose).     Shots:     Get a flu shot each year.     Get a tetanus shot every 10 years.     Consider getting the shot (vaccine) that prevents cervical cancer (Gardasil).    Nutrition:     Eat at least 5 servings of fruits and vegetables each day.    Eat whole-grain bread, whole-wheat pasta and brown rice instead of white grains and rice.    Talk to your provider about Calcium and  Vitamin D.     Lifestyle    Exercise at least 150 minutes a week each week (30 minutes a day, 5 days a week). This will help you control your weight and prevent disease.    Limit alcohol to one drink per day.    No smoking.     Wear sunscreen to prevent skin cancer.    See your dentist every six months for an exam and cleaning.

## 2023-07-02 ENCOUNTER — HEALTH MAINTENANCE LETTER (OUTPATIENT)
Age: 28
End: 2023-07-02

## 2023-12-03 DIAGNOSIS — F33.1 MODERATE EPISODE OF RECURRENT MAJOR DEPRESSIVE DISORDER (H): ICD-10-CM

## 2023-12-03 NOTE — LETTER
[facility]  [address]  [facility] [address]  [phone]  December 12, 2023    Amy Jauregui  [address] [place] [address]    Dear Amy,    We care about your health and have reviewed your health plan including your medical conditions, medication list, and lab results.  Based on this review, it is recommended that you follow up regarding the following health topic(s):     -Wellness (Physical) Visit & Medication Refills - MUST make appointment    Please call us at [phone] (or use [employer]) to schedule appt to address the above recommendations.     Thank you for trusting [facility] and we appreciate the opportunity to serve you.  We look forward to supporting your healthcare needs in the future.    Healthy Regards,      Your Health Care Team  [facility]

## 2023-12-07 RX ORDER — SERTRALINE HYDROCHLORIDE 100 MG/1
100 TABLET, FILM COATED ORAL DAILY
Qty: 90 TABLET | Refills: 0 | Status: SHIPPED | OUTPATIENT
Start: 2023-12-07

## 2023-12-12 NOTE — TELEPHONE ENCOUNTER
LM for pt - Also sent My Chart message and mailed letter   [place]

## 2024-08-25 ENCOUNTER — HEALTH MAINTENANCE LETTER (OUTPATIENT)
Age: 29
End: 2024-08-25